# Patient Record
Sex: MALE | Race: WHITE | NOT HISPANIC OR LATINO | Employment: UNEMPLOYED | URBAN - METROPOLITAN AREA
[De-identification: names, ages, dates, MRNs, and addresses within clinical notes are randomized per-mention and may not be internally consistent; named-entity substitution may affect disease eponyms.]

---
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
## 2021-05-26 VITALS
RESPIRATION RATE: 16 BRPM | TEMPERATURE: 98.1 F | OXYGEN SATURATION: 96 % | HEART RATE: 84 BPM | SYSTOLIC BLOOD PRESSURE: 90 MMHG | DIASTOLIC BLOOD PRESSURE: 60 MMHG

## 2021-05-30 ENCOUNTER — RECORDS - HEALTHEAST (OUTPATIENT)
Dept: ADMINISTRATIVE | Facility: CLINIC | Age: 21
End: 2021-05-30

## 2021-05-31 ENCOUNTER — RECORDS - HEALTHEAST (OUTPATIENT)
Dept: ADMINISTRATIVE | Facility: CLINIC | Age: 21
End: 2021-05-31

## 2021-05-31 VITALS — HEIGHT: 60 IN | WEIGHT: 92 LBS | BODY MASS INDEX: 18.06 KG/M2

## 2021-05-31 VITALS — HEIGHT: 60 IN | WEIGHT: 91.3 LBS | BODY MASS INDEX: 17.92 KG/M2

## 2021-05-31 VITALS — WEIGHT: 90.6 LBS | HEIGHT: 60 IN | BODY MASS INDEX: 17.79 KG/M2

## 2021-06-01 VITALS — WEIGHT: 98.5 LBS | HEIGHT: 61 IN | BODY MASS INDEX: 18.6 KG/M2

## 2021-06-01 VITALS — WEIGHT: 94.9 LBS | BODY MASS INDEX: 18.38 KG/M2

## 2021-06-02 ENCOUNTER — RECORDS - HEALTHEAST (OUTPATIENT)
Dept: ADMINISTRATIVE | Facility: CLINIC | Age: 21
End: 2021-06-02

## 2021-06-03 NOTE — PATIENT INSTRUCTIONS - HE
This is likely a resolving cold sore. Recommend keeping the area moist with Vaseline. Try not to lick or pick at the area. Things to watch for and return to clinic are :yellow crusting on the area of opening, redness, pain, and worsening swelling.

## 2021-06-03 NOTE — PROGRESS NOTES
Chief Complaint   Patient presents with     lower lip swollen and splitting x 2-3 days     worsening yesterday and today         HPI:  Sudeep Aguayo is a 19 y.o. male who presents today complaining of lower lip lesion x 2 days. Per mother, patient developed a blister like sore on his lower lip that he has picking at and licking his lips. Mother is concerns that his lower lip is more swollen and that it might be infected. Patient have had a history of cold sores in the past. Mother and patient denies any fever, chills, nausea, vomiting, diarrhea, rashes or shortness of breath. Mother also denies trying anything OTC.      History obtained from mother and the patient.    Problem List:  2017-06: Subclinical hypothyroidism  2017-06: Fluency disorder associated with underlying disease  2016-01: Vitamin D deficiency  2014-11: Anxiety state, unspecified  Poor Coordination  Patent Foramen [place]  Delayed Developmental Milestones  Attention deficit hyperactivity disorder (ADHD)  Warts  Coffin-Raiford Syndrome  Short stature disorder      Past Medical History:   Diagnosis Date     Choanal atresia      Chronic maxillary sinusitis      Chronic pulmonary aspiration      Chronic serous otitis media of both ears      Closed fracture of right distal fibula 11/2004    due to bone cyst     Cryptorchidism      DU (perforated duodenal ulcer) (H) 12/2002     Echocardiogram abnormal 12/2003    small PFO     Esophageal reflux      FTT (failure to thrive) in child      Hypotonia      Lacrimal duct stenosis, bilateral      Positional plagiocephaly     had helmet from 5-8 months of age     S/P percutaneous endoscopic gastrostomy (PEG) tube placement (H)      Speech delay      Torticollis      Trachea displaced     right arytenoid     Varicella 09/15/2006     Warts        Social History     Tobacco Use     Smoking status: Never Smoker     Smokeless tobacco: Never Used   Substance Use Topics     Alcohol use: Not on file       Review of Systems    Constitutional: Negative for activity change, chills and fever.   HENT: Positive for mouth sores. Negative for congestion, drooling, sore throat, trouble swallowing and voice change.    Eyes: Negative for photophobia, pain, discharge and itching.   Respiratory: Negative for cough, choking, chest tightness, shortness of breath and wheezing.    Cardiovascular: Negative for chest pain.   Gastrointestinal: Negative.    Genitourinary: Negative.    Musculoskeletal: Negative.    Skin: Negative for color change and rash.   Neurological: Negative.    Psychiatric/Behavioral: Negative.        Vitals:    11/25/19 1345   BP: (!) 90/60   Patient Site: Right Arm   Patient Position: Sitting   Cuff Size: Adult Small   Pulse: 84   Resp: 16   Temp: 98.1  F (36.7  C)   TempSrc: Oral   SpO2: 96%       Physical Exam  HENT:      Head: Normocephalic and atraumatic.      Nose: Nose normal.      Mouth/Throat:      Mouth: Mucous membranes are moist.      Comments: A .5x.5cm sore noted on the bottom lip that has scabbed over.  Cardiovascular:      Rate and Rhythm: Normal rate.   Pulmonary:      Effort: Pulmonary effort is normal.      Breath sounds: Normal breath sounds.   Musculoskeletal: Normal range of motion.         General: No swelling or tenderness.   Skin:     General: Skin is warm and dry.   Neurological:      Mental Status: He is alert. Mental status is at baseline.         Clinical Decision Making:  Given the clinical presentation and time frame this is likely a resolving cold sore. Other differential to consider are angioedema, dry lip, trauma, impetigo or allergies. It was likely that it started out with a cold sore, but today there is scabbing noted over the site of the sore. Therefore, antiviral is unlikely to be helpful at this time. Recommend to keep lips clean and moist, refrain from licking or picking at the scab, and return to clinic if the there are redness, yellow fluid or crusting, increase pain, or swelling.     At  the end of the encounter, I discussed results, diagnosis, medications. Discussed red flags for immediate return to clinic/ER, as well as indications for follow up if no improvement. Patient understood and agreed to plan. Patient was stable for discharge.    1. Lip lesion           Patient Instructions   This is likely a resolving cold sore. Recommend keeping the area moist with Vaseline. Try not to lick or pick at the area. Things to watch for and return to clinic are :yellow crusting on the area of opening, redness, pain, and worsening swelling.

## 2021-06-04 VITALS
BODY MASS INDEX: 19.53 KG/M2 | HEIGHT: 63 IN | WEIGHT: 110.2 LBS | SYSTOLIC BLOOD PRESSURE: 88 MMHG | HEART RATE: 80 BPM | OXYGEN SATURATION: 97 % | DIASTOLIC BLOOD PRESSURE: 62 MMHG

## 2021-06-04 VITALS
WEIGHT: 105.4 LBS | TEMPERATURE: 98.5 F | HEART RATE: 110 BPM | DIASTOLIC BLOOD PRESSURE: 56 MMHG | OXYGEN SATURATION: 99 % | SYSTOLIC BLOOD PRESSURE: 106 MMHG | RESPIRATION RATE: 16 BRPM | BODY MASS INDEX: 19.92 KG/M2

## 2021-06-05 VITALS
SYSTOLIC BLOOD PRESSURE: 115 MMHG | TEMPERATURE: 99 F | OXYGEN SATURATION: 96 % | WEIGHT: 108 LBS | RESPIRATION RATE: 16 BRPM | BODY MASS INDEX: 18.98 KG/M2 | HEART RATE: 94 BPM | DIASTOLIC BLOOD PRESSURE: 75 MMHG

## 2021-06-05 NOTE — PATIENT INSTRUCTIONS - HE
Keep dressing on the finger for 24-48 hours then may remove and reapply as needed  Wash gently with mild soap and water twice a day and pat dry  Apply vaseline or bacitracin type antibiotic ointment before applying dressing to avoid sticking  The nail will likely come off in time.   Recheck if increasing pain, swelling, redness, pus drainage.   It may ooze a little bit for the first couple days  Tylenol and ibuprofen will help with the pain  Apply ice every couple hours for the first few days then as needed  Tetanus (Tdap) booster given today.

## 2021-06-05 NOTE — PROGRESS NOTES
Assessment/Plan:   Injury of finger of right hand, initial encounter  Contusion and crush injury to the right index finger tip - closed in car door this morning. There is intact function of the DIP joint, no fracture or dislocation on xray. There is a superficial skin tear lateral side of the nailbed which is clean and dry with adherent skin, does not extend into the dermis. The cuticle is cracked and disrupted near that skin tear as well and there is swelling and bruising along the nail bed. Nail is currently intact. There is some blood under the nail, only extending 1-2mm at this time. No suture required. Wound was cleaned as described below and a dressing applied. Continue elevation and ice, wound care. Recheck as needed.   I discussed red flag symptoms, return precautions to clinic/ER and follow up care with patient/parent.  Expected clinical course, symptomatic cares advised. Questions answered. Patient/parent amenable with plan.  - XR Finger Right 2 or More VWS  - Tdap vaccine,  8yo or older,  IM    Keep dressing on the finger for 24-48 hours then may remove and reapply as needed  Wash gently with mild soap and water twice a day and pat dry  Apply vaseline or bacitracin type antibiotic ointment before applying dressing to avoid sticking  The nail will likely come off in time.   Recheck if increasing pain, swelling, redness, pus drainage.   It may ooze a little bit for the first couple days  Tylenol and ibuprofen will help with the pain  Apply ice every couple hours for the first few days then as needed  Tetanus (Tdap) booster given today.     Subjective:      Sudeep Aguayo is a 19 y.o. male who presents with his dad for evaluation of an injury to his right index finger which got caught in the truck door this morning. The door closed all the way and the dad had to open it to release the finger. He was not wearing gloves. There has developed some swelling and a little bleeding at the base of the nail. The  school nurse suggested he have it evaluated further and have his tetanus shot updated. He can bend the finger but it is getting stiff due to swelling. He has used an ice pack from school for the last hour or so. There is a small amount of ongoing bleeding.   Last Dtap was 2012.   He feels well otherwise, no recent fever, URI, cough, shortness of breath. No rashes. No N/V/D.   Non-smoker.     No Known Allergies    Current Outpatient Medications on File Prior to Visit   Medication Sig Dispense Refill     guanFACINE (TENEX) 1 MG tablet Take 1 mg by mouth at bedtime.       methylphenidate HCl (METADATE CD) 30 MG CR capsule TK 1 C PO QAM  0     FLUoxetine (PROZAC) 10 MG tablet TK 1 T PO  QAM  3     guanFACINE 1 mg Tb24 TK 1 T PO QAM       levothyroxine (SYNTHROID, LEVOTHROID) 25 MCG tablet Take 25 mcg by mouth.        methylphenidate HCl (METADATE CD) 30 MG CR capsule   0     No current facility-administered medications on file prior to visit.      Patient Active Problem List   Diagnosis     Poor Coordination     Delayed Developmental Milestones     Attention deficit hyperactivity disorder (ADHD)     Coffin-Gino Syndrome     Short stature disorder     Anxiety state, unspecified     Vitamin D deficiency     Fluency disorder associated with underlying disease     Subclinical hypothyroidism       Objective:     /56 (Patient Site: Right Arm, Patient Position: Sitting, Cuff Size: Adult Small)   Pulse (!) 110   Temp 98.5  F (36.9  C) (Oral)   Resp 16   Wt 105 lb 6.4 oz (47.8 kg)   SpO2 99%     Physical  General Appearance: Alert, cooperative, no distress, AVSS  Head: Normocephalic, without obvious abnormality, atraumatic  Eyes: Conjunctivae are normal.   Nose: No significant congestion.  Extremities/neuro/circulation: the right second finger is swollen at the distal phalanx, there is contusion and skin disruption at the base of the nail and side nearest the 3rd finger. There is swelling, redness and bruising just  proximal to and along the base of the nail but the nails itself is intact. There is some blood under the nail, just 1-2mm extended from the base at this time. There is a crack in the cuticle on one side but the nail does not appear to be loose at the base. There is a superficial semicircular skin tear on the side of the nail near the 3rd finger - it is oozing slightly but not actively bleeding, it does not extend into the dermis and the epidermis is quite adherent.   The cap refill of the tip is normal, the finger is pink and warm, normal wrist pulses.   He has active flexion and extension at DIP, PIP and MCP though the distal tip is getting stiffer due to swelling.   Sensation intact.   After obtaining xray, the finger was soaked in soapy water and the wound cleansed. Bacitracin was applied and vaseline guaze, then kerlix and coban wrap - an aluminum finger splint was used to protect the tip from bumping into things.   Skin:  As above, no other rashes or lesions  Psychiatric: Patient has a normal mood and affect.      Xray of the right index finger obtained and viewed by me showed no fracture or dislocation.     Xr Finger Right 2 Or More Vws    Result Date: 1/23/2020  EXAM: XR FINGER RIGHT 2 OR MORE VWS LOCATION: [facility] DATE/TIME: 1/23/2020 9:31 AM INDICATION: slammed finger in car door COMPARISON: None.     Normal joint spaces and alignment. No fracture.

## 2021-06-11 NOTE — PROGRESS NOTES
"[facility] ADHD Medication Check:    SUBJECTIVE:  Sudeep Aguayo is a 17 y.o. male with ADHD, developmental delay, short stature, and Coffin-Homeland syndrome, who comes to clinic with his mother for an ADHD medication check. He is currently on Metadate CD 30 mg-this was changed from Concerta due to insurance. Mom reports that the dose is working well during the school day. He recently completed 10th grade and has an IEP.     However, mom is concerned about \"episodes of being obsessed\", particularly after school. This has happened several times. Mom gives me the example of when they were at a store and he wanted to buy something for a friend and wouldn't take his mom's suggestions. He made \"a fuss\" per mom and the police were called. Mom states that this has happened several times-he becomes angry and makes a \"scene\", and she's not sure what to do. Today he is very upset that he's having to miss summer [place] due to appointments for he and mother. Mom states that once he gets an idea of something he wants to do, he perseverates on it and becomes angry when he's told he can't do it. He does not attend therapy. He does become physical with these outbursts.    Mom also has a question about his endocrine management. He is being followed by Dr. Borrero at [facility] and has been on Omnitrope for short stature and levothyroxine for subclinical hypothyroidism. Mom stopped his Omnitrope about a month ago because she states she was happy with his growth. She is wondering how much longer he will need to take the levothyroxine. There are upcoming concerns with a fall out between [facility] and [facility], which will not allow Sudeep to continue care, so mom has questions about what to do next. He does have a refill on his levothyroxine and his labs were last checked in March of 2017 and were normal.    Pt is currently on [place]adale 30 mg daily  He was last seen on 11/1/16    Possible Side " Effects: none  Use on weekends and holidays: yes  When takes medicine: mornings  Time that is wears off: after school, around 3-4 pm    Current school and grade level: Going into 11th grade  Special classes if any: has an IEP  CSA on File: no, will complete today    Past Medical History:  Past Medical History:   Diagnosis Date     Choanal atresia      Chronic maxillary sinusitis      Chronic pulmonary aspiration      Chronic serous otitis media of both ears      Closed fracture of right distal fibula 11/2004    due to bone cyst     Cryptorchidism      DU (perforated duodenal ulcer) 12/2002     Echocardiogram abnormal 12/2003    small PFO     Esophageal reflux      FTT (failure to thrive) in child      Hypotonia      Lacrimal duct stenosis, bilateral      Positional plagiocephaly     had helmet from 5-8 months of age     S/P percutaneous endoscopic gastrostomy (PEG) tube placement      Speech delay      Torticollis      Trachea displaced     right arytenoid     Warts      Past Surgical History:   Procedure Laterality Date     BRONCHOSCOPY  03/2003     Duodenal Ulcer Perforation Repair  12/2002     ORCHIOPEXY Bilateral 11/01/2002     PEG TUBE PLACEMENT  12/2002     REPAIR CHOANAL ATRESIA Bilateral 2001     SKIN BIOPSY       TONSILECTOMY, ADENOIDECTOMY, BILATERAL MYRINGOTOMY AND TUBES Bilateral 2002    at [place]       Current Meds:   Current Outpatient Prescriptions on File Prior to Visit   Medication Sig Dispense Refill     levothyroxine (SYNTHROID, LEVOTHROID) 25 MCG tablet Take 25 mcg by mouth.        [DISCONTINUED] somatropin (OMNITROPE) 10 mg/1.5 mL (6.7 mg/mL) Crtg Inject 1.6 mL under the skin daily.  1.5 mL 0     No current facility-administered medications on file prior to visit.      Allergies: No Known Allergies    ROS:  General: Health is good  Endocrine: Growing in height and weight as expected for syndrome  Cardiac: No chest pain, palpitations, or syncope, No chest pain with exercise   GI: Good  appetite, no stomachaches, no nausea, no diarrhea, no emesis, no constipation  : no enuresis  Neuro: No headaches, sleep disturbance, tics, changes in mood, no changes in activity level.     Exam:  Vitals:    06/15/17 0810   BP: 120/54   Weight: (!) 91 lb 4.8 oz (41.4 kg)   Height: 5' (1.524 m)       MENTAL STATUS:  Gen: Alert, oriented. Syndromic facies  Speech: Speech dysarthric  Mood: euthymic.    Thought content: No abnormal thought content.  Judgment: intact  Fund of knowledge: appropriate for age and stage of development  Neck: thyroid non enlarged  Cardiovascular Exam: RRR without murmurs, clicks or gallops.  Lung Exam: Clear and equal breath sounds.  Musculoskeletal Exam: Gross survey unremarkable. Gait uncoordinated. Short stature    ASSESSMENT/PLAN:    ICD-10-CM    1. Attention deficit hyperactivity disorder (ADHD) F90.9 methylphenidate (METADATE CD) 30 MG CR capsule     methylphenidate (METADATE CD) 30 MG CR capsule     methylphenidate (METADATE CD) 30 MG CR capsule   2. Subclinical hypothyroidism E03.9      Three sequential prescriptions for Metadate CD 30 mg daily sent to pharmacy. Mom advised to contact clinic towards the end of his third month of prescriptions to obtain an additional three sequential months  Completed Controlled Substance Agreement today  Advised getting him established with a therapist, to be seen on a regular basis for coping and anger management strategies. List of local services provided to mom and mom advised to contact and establish care. Mom acknowledged understanding and agrees with plan.  Recommended that mom get him seen for a 16 month WCC and update in vaccinations within the next month. Mom acknowledged understanding and agrees with plan.    Called and discussed Endocrinology plan with Dr. Borrero given upcoming [facility] concerns [facility]. Patient may not be able to be seen due to insurance issues, so plan will be as follows:   -okay  to stop Omnitrope-mom okay with his height   -continue levothyroxine 25 mcg tablet daily   -will need recheck of TSH, free T4 in September 2017. If normal, could consider discontinuing if mom wants to have him off it (has likely achieved maximum height and his symptoms are subclinical)   -if insurance issues resolve, would plan to have him re-establish care with [facility]   -this plan was communicated to mom via voicemail and letter will also be sent    A total of 40 minutes was spent on encounter, greater than 50% of which was on counseling and coordination of care     Kelsey Drake ....................  6/20/2017   8:16 AM

## 2021-06-12 NOTE — TELEPHONE ENCOUNTER
Per Sheets Sr. calls that Sudeep got off from work today and developed a scratchy throat. Also has nasal drainage for weeks. Concerned about COVID.    No fever, no chest pain, NO shortness of breath.    Per FNA chart review, patient does have developmental delays and ADHD. He works every weekend.    FNA advised COVID testing, and isolate until results return negative or further instructions from care team. Per verbalized understanding. Agreed with virtual urgent care visit.    COVID 19 Nurse Triage Plan/Patient Instructions    Please be aware that novel coronavirus (COVID-19) may be circulating in the community. If you develop symptoms such as fever, cough, or SOB or if you have concerns about the presence of another infection including coronavirus (COVID-19), please contact your health care provider or visit [email or web address].     Disposition/Instructions    Virtual Visit with provider recommended. Reference Visit Selection Guide. (urgent care with [place] within the day) Per verbalized agreement.    Thank you for taking steps to prevent the spread of this virus.  o Limit your contact with others.  o Wear a simple mask to cover your cough.  o Wash your hands well and often.    Resources    [facility] [place]: About COVID-19: [email or web address]    CDC: What to Do If You're Sick: www.cdc.gov/coronavirus/2019-ncov/about/steps-when-sick.html    CDC: Ending Home Isolation: www.cdc.gov/coronavirus/2019-ncov/hcp/disposition-in-home-patients.html     CDC: Caring for Someone: www.cdc.gov/coronavirus/2019-ncov/if-you-are-sick/care-for-someone.html     [facility]: Interim Guidance for Hospital Discharge to Home: www.health.state.[place]/diseases/coronavirus/hcp/hospdischarge.pdf    [facility] clinical trials (COVID-19 research studies): [email or web address]     Below are the COVID-19 hotlines at the [place] Department of Health ([facility]). Interpreters are available.   o For health questions:  Call [phone] or 1-818.715.2251 (7 a.m. to 7 p.m.)  o For questions about schools and childcare: Call [phone] or 1-610.907.7347 (7 a.m. to 7 p.m.)     Pat Samuel RN/[place] Nurse Advisor      Reason for Disposition    [1] COVID-19 infection suspected by caller or triager AND [2] mild symptoms (cough, fever, or others) AND [3] no complications or SOB    Additional Information    Negative: SEVERE difficulty breathing (e.g., struggling for each breath, speaks in single words)    Negative: Difficult to awaken or acting confused (e.g., disoriented, slurred speech)    Negative: Bluish (or gray) lips or face now    Negative: Shock suspected (e.g., cold/pale/clammy skin, too weak to stand, low BP, rapid pulse)    Negative: Sounds like a life-threatening emergency to the triager    Negative: SEVERE or constant chest pain or pressure (Exception: mild central chest pain, present only when coughing)    Negative: MODERATE difficulty breathing (e.g., speaks in phrases, SOB even at rest, pulse 100-120)    Negative: Patient sounds very sick or weak to the triager    Negative: MILD difficulty breathing (e.g., minimal/no SOB at rest, SOB with walking, pulse <100)    Negative: Chest pain or pressure    Negative: Fever > 103 F (39.4 C)    Negative: [1] Fever > 101 F (38.3 C) AND [2] age > 60    Negative: [1] Fever > 100.0 F (37.8 C) AND [2] bedridden (e.g., nursing home patient, CVA, chronic illness, recovering from surgery)    Negative: HIGH RISK patient (e.g., age > 64 years, diabetes, heart or lung disease, weak immune system) (Exception: Has already been evaluated by healthcare provider and has no new or worsening symptoms)    Negative: Fever present > 3 days (72 hours)    Negative: [1] Fever returns after gone for over 24 hours AND [2] symptoms worse or not improved    Negative: [1] Continuous (nonstop) coughing interferes with work or school AND [2] no improvement using cough treatment per protocol    Protocols  used: CORONAVIRUS (COVID-19) DIAGNOSED OR [id number] 8.4.20

## 2021-06-12 NOTE — TELEPHONE ENCOUNTER
Question following Office Visit  When did you see your provider: 11/3  What is your question: Caller stated she was informed by Dr Nesbitt that was going to have someone contact patient to get tested for strep and they have no heard anything. Caller stated that she is very concerned as the patient's throat is very painful and he can hardly eat. Caller would like a call back to schedule for strep test as soon as possible.  Okay to leave a detailed message: Yes  [phone]

## 2021-06-12 NOTE — PROGRESS NOTES
Assessment:      Satisfactory school sports physical exam.  (adaptive sports)    2. Poor coordination  3.  Short stature  4.  Coffin-McDonald syndrome   Plan:      Permission granted to participate in athletics without restrictions. Form signed and returned to patient.  Anticipatory guidance: Specific topics reviewed: bicycle helmets, drugs, ETOH, and tobacco, importance of regular dental care, importance of regular exercise, importance of varied diet, minimize junk food, puberty, seat belts and testicular self-exam.     Subjective:       Sudeep Aguayo is a 17 y.o. male who presents for a school sports physical exam. Patient/parent deny any current health related concerns.  He plans to participate in tennis, other adaptive sports. He cannot participate in collision contact sports.     Immunization History   Administered Date(s) Administered     DTaP, historic 2001, 2001, 01/07/2002, 02/10/2003, 06/27/2006     Hep B, historic 2001, 2001, 2001, 01/07/2002     HiB, historic 2001, 2001, 01/07/2002, 02/10/2003     History of Varicella/chicken Pox 09/15/2006     IPV 2001, 2001, 2001, 01/07/2002, 06/27/2006     Influenza, inj, historic 02/10/2003, 12/10/2003, 10/19/2004, 11/15/2005, 09/21/2010     MMR 06/05/2002, 06/27/2006     Pneumo Conj 7-V(before 2010) 06/25/2003     Tdap 08/16/2012     Varicella 02/10/2003       The following portions of the patient's history were reviewed and updated as appropriate: allergies, current medications, past family history, past medical history, past social history, past surgical history and problem list.    Review of Systems  Pertinent items are noted in HPI      Objective:      BP 98/60  Pulse 74  Resp 20  Ht 5' (1.524 m)  Wt (!) 92 lb (41.7 kg)  BMI 17.97 kg/m2    General Appearance:  Alert, cooperative, no distress, appropriate for age                             Head:  Normocephalic, no obvious abnormality                               Eyes:  PERRL, EOM's intact, conjunctiva and corneas clear, fundi benign, both eyes                              Nose:  Nares symmetrical, septum midline, mucosa pink, clear watery discharge; no sinus tenderness                           Throat:  Lips, tongue, and mucosa are moist, pink, and intact; teeth intact                              Neck:  Supple, symmetrical, trachea midline, no adenopathy; thyroid: no enlargement, symmetric,no tenderness/mass/nodules; no carotid bruit, no JVD                              Back:  Symmetrical, no curvature, ROM normal, no CVA tenderness                Chest/Breast:  No mass or tenderness                            Lungs:  Clear to auscultation bilaterally, respirations unlabored                              Heart:  Normal PMI, regular rate & rhythm, S1 and S2 normal, no murmurs, rubs, or gallops                      Abdomen:  Soft, non-tender, bowel sounds active all four quadrants, no mass, or organomegaly               Genitourinary:  deferred          Musculoskeletal:  Tone and strength strong and symmetrical, all extremities                     Lymphatic:  No adenopathy             Skin/Hair/Nails:  Skin warm, dry, and intact, no rashes or abnormal dyspigmentation                   Neurologic:  Alert and oriented x3, no cranial nerve deficits, normal strength and tone, gait steady

## 2021-06-12 NOTE — PROGRESS NOTES
"Sudeep Aguayo is a 20 y.o. male who is being evaluated via a billable telephone visit.      The patient has been notified of following:     \"This telephone visit will be conducted via a call between you and your physician/provider. We have found that certain health care needs can be provided without the need for a physical exam.  This service lets us provide the care you need with a short phone conversation.  If a prescription is necessary we can send it directly to your pharmacy.  If lab work is needed we can place an order for that and you can then stop by our lab to have the test done at a later time.    Telephone visits are billed at different rates depending on your insurance coverage. During this emergency period, for some insurers they may be billed the same as an in-person visit.  Please reach out to your insurance provider with any questions.    If during the course of the call the physician/provider feels a telephone visit is not appropriate, you will not be charged for this service.\"    Patient has given verbal consent to a Telephone visit? Yes    What phone number would you like to be contacted at? [phone]    Patient would like to receive their AVS by AVS Preference: Charlie.    Additional provider notes:  Assessment/Plan:    1. Sore throat  2. Rhinorrhea  Somewhat difficult situation at this time.  Symptoms suspicious for COVID-19 versus strep throat. Pt has COVID19 test pending (outside of system), result will likely return within 24 hrs. I do not feel comfortable having him come to clinic for strep testing given COVID19 test is pending; I also would recommend not starting empiric antibiotics when COVID19 test should be back within 24 hours. Would recommend having pt be seen at [facility] for strep testing vs waiting until COVID19 test comes back and if negative then could consider empiric strep throat treatment. Clinic staff/manager discussed with parents and they will bring pt to [facility] today to get swab " "done, no charge for our telephone visit.      Phone call duration:  12 minutes    Follow up: as needed    So Nesbitt MD  [facility]    Subjective:    Patient ID: Sudeep Aguayo is a 20 y.o. male had telephone visit today for sore throat    Sore throat  -spoke primarily with mom but pt present as well  -mom states over the past few years pt will get a mild sore throat and have \"gunk in throat\" and have to clear throat - they have attributed this to allergies  -mom states this started a few weeks ago but now sore throat has been worsening - she states it is hard for him to swallow and his voice is hoarse  -mom states pt has been drinking but not eating as much d/t pain  -no fever  -no cough, shortness of breath, ear sx, nausea/vomiting, diarrhea, abd pain  -pt also has rhinorrhea/congestion but mom thinks this could be allergy related  -per chart review: dad called on 11/1 at 4:37pm and spoke with nurse triage regarding symptoms, they were referred to [facility] for COVID19 testing (scheduled tomorrow afternoon)  -mother very frustrated by inability to get testing done quickly      Exam:  General: Answering questions appropriately, linear thought process, does not sound short of breath, no cough heard, voice somewhat hoarse    Patient Active Problem List   Diagnosis     Poor Coordination     Delayed Developmental Milestones     Attention deficit hyperactivity disorder (ADHD)     Coffin-Gino syndrome     Short stature disorder     Anxiety state, unspecified     Vitamin D deficiency     Fluency disorder associated with underlying disease     Subclinical hypothyroidism     Past Medical History:   Diagnosis Date     Choanal atresia      Chronic maxillary sinusitis      Chronic pulmonary aspiration      Chronic serous otitis media of both ears      Closed fracture of right distal fibula 11/2004    due to bone cyst     Cryptorchidism      DU (perforated duodenal ulcer) (H) 12/2002     Echocardiogram " abnormal 12/2003    small PFO     Esophageal reflux      FTT (failure to thrive) in child      Hypotonia      Lacrimal duct stenosis, bilateral      Positional plagiocephaly     had helmet from 5-8 months of age     S/P percutaneous endoscopic gastrostomy (PEG) tube placement (H)      Speech delay      Torticollis      Trachea displaced     right arytenoid     Varicella 09/15/2006     Warts      Past Surgical History:   Procedure Laterality Date     BRONCHOSCOPY  03/2003     Duodenal Ulcer Perforation Repair  12/2002     ORCHIOPEXY Bilateral 11/01/2002     PEG TUBE PLACEMENT  12/2002     REPAIR CHOANAL ATRESIA Bilateral 2001     SKIN BIOPSY       TONSILECTOMY, ADENOIDECTOMY, BILATERAL MYRINGOTOMY AND TUBES Bilateral 2002    at [place]     Current Outpatient Medications on File Prior to Visit   Medication Sig Dispense Refill     guanFACINE 1 mg Tb24 TK 1 T PO QAM 90 tablet 3     methylphenidate HCl (METADATE CD) 30 MG CR capsule   0     No current facility-administered medications on file prior to visit.      No Known Allergies  Social History     Socioeconomic History     Marital status: Single     Spouse name: Not on file     Number of children: Not on file     Years of education: Not on file     Highest education level: Not on file   Occupational History     Not on file   Social Needs     Financial resource strain: Not on file     Food insecurity     Worry: Not on file     Inability: Not on file     Transportation needs     Medical: Not on file     Non-medical: Not on file   Tobacco Use     Smoking status: Never Smoker     Smokeless tobacco: Never Used     Tobacco comment: no vaping   Substance and Sexual Activity     Alcohol use: Never     Frequency: Never     Drug use: Never     Sexual activity: Not on file   Lifestyle     Physical activity     Days per week: Not on file     Minutes per session: Not on file     Stress: Not on file   Relationships     Social connections     Talks on phone: Not on file     Gets  together: Not on file     Attends Muslim service: Not on file     Active member of club or organization: Not on file     Attends meetings of clubs or organizations: Not on file     Relationship status: Not on file     Intimate partner violence     Fear of current or ex partner: Not on file     Emotionally abused: Not on file     Physically abused: Not on file     Forced sexual activity: Not on file   Other Topics Concern     Not on file   Social History Narrative    Lives with mom, dad, older half sister, and older sister.     Family History   Problem Relation Age of Onset     ADD / ADHD Cousin         paternal cousin     Diabetes type II Maternal Grandmother      Cancer Paternal Grandfather      Review of systems is as stated in HPI, and the remainder of system review is otherwise negative.

## 2021-06-12 NOTE — TELEPHONE ENCOUNTER
Father states he never rec'd a call he expected @ 8pm for virtual UC visit for his son.   Per [facility] , note found in chart: attempted x4, no answer, to [phone]--which is the correct phone number.    Virtual visits are closed for this evening. Suggested he try [email or web address], which he intends to do now.     Geena Redmond RN Triage Nurse Advisor

## 2021-06-13 NOTE — PROGRESS NOTES
[facility] ADHD Medication Check:    SUBJECTIVE:  Sudeep Aguayo is a 17 y.o. male here with father to follow up on ADHD.     Behavior: His father feels that things are going alright, while he thinks they are going well. He recently got the black tip belt in [facility], unfortunately he had it take it away due to being aggressive after the session. He does still struggle with some aggressive behaviors whenever he is upset. Father states that his aggressive behaviors never occur when he is present at home, that it has a tendency to occur whenever he is in the care of his mother.     At school everything was going well except for last Monday because he missed the bus on purpose and ended up at [facility] and not at home. A para found him at [facility] and reported him to his parents. As a consequence he is no longer allowed to participate in after school activities. His father is trying to help him understand that if and when he is allowed to participate in the [employer] group, that he is unable to attend all of the events. The medications have helped him in the classroom specifically with fidgeting. However, his father is wondering whether he needs to continue on the same dose. He is generally not aggressive at school. He tends to have more behavior problems and aggression at the end of the school day when the medication is starting to wear off.     In his previous visit it was discussed that he should consider a therapist, but his father admits that it was not done or followed up on.      Pt is currently on Metadate CD 30 mg   He was last seen on 6/15/17    Possible Side Effects: none. Possibly preventing weight gain.   Use on weekends and holidays: Not given on the weekends because it is a stimulant and an appetite suppressant.   When takes medicine: Taken in the mornings.   Time that is wears off: Tends to wear off after school around 3-4pm.     Current school and grade level: 11th grade   Special classes  if any: Has an IEP. In the future he will be participating in a [employer] program that will teach him to be more independent.   Peer interactions: He claims they are going well.   Mood: Has had three aggressive episodes when in the care of his mother.   Sleep: Fine.   Swallow pills: yes  Drug use: none  Other concerns or comments: All mentioned in HPI.   CSA on File: Yes, 6/15/17.     Social history:   Lives at home with Mother, Father, and sister (?)  Family stressors: Relationship with mother.     Family history:   ADHD: Yes, Cousin with ADD/ADHD.   Learning problems: n/a  Anxiety, Bipolar, depression: n/a  Tic's or tourette's: n/a  Hypertrophic cardiomyopathy, long Qtc and sudden death: n/a    Past Medical History:  Past Medical History:   Diagnosis Date     Choanal atresia      Chronic maxillary sinusitis      Chronic pulmonary aspiration      Chronic serous otitis media of both ears      Closed fracture of right distal fibula 11/2004    due to bone cyst     Cryptorchidism      DU (perforated duodenal ulcer) 12/2002     Echocardiogram abnormal 12/2003    small PFO     Esophageal reflux      FTT (failure to thrive) in child      Hypotonia      Lacrimal duct stenosis, bilateral      Positional plagiocephaly     had helmet from 5-8 months of age     S/P percutaneous endoscopic gastrostomy (PEG) tube placement      Speech delay      Torticollis      Trachea displaced     right arytenoid     Varicella 09/15/2006     Warts      Past Surgical History:   Procedure Laterality Date     BRONCHOSCOPY  03/2003     Duodenal Ulcer Perforation Repair  12/2002     ORCHIOPEXY Bilateral 11/01/2002     PEG TUBE PLACEMENT  12/2002     REPAIR CHOANAL ATRESIA Bilateral 2001     SKIN BIOPSY       TONSILECTOMY, ADENOIDECTOMY, BILATERAL MYRINGOTOMY AND TUBES Bilateral 2002    at [place]       Current Meds:   Current Outpatient Prescriptions on File Prior to Visit   Medication Sig Dispense Refill     levothyroxine (SYNTHROID,  "LEVOTHROID) 25 MCG tablet Take 25 mcg by mouth.        No current facility-administered medications on file prior to visit.      Allergies: No Known Allergies    ROS:  General: Health is good  Endocrine: Growing in height and weight well.  Cardiac: No chest pain, palpitations, or syncope, No chest pain with exercise   GI: Good appetite, no stomachaches, no nausea, no diarrhea, no emesis, no constipation  : no enuresis  Neuro: No headaches, sleep disturbance, tics, changes in mood, no changes in activity level.     Exam:  Vitals:    09/27/17 1609   BP: 108/52   Weight: (!) 90 lb 9.6 oz (41.1 kg)   Height: 5' 0.25\" (1.53 m)       MENTAL STATUS:  Gen: Alert, oriented. Syndromic facies.   Speech:No abnormal speech or flight of ideas. Speech dysarthric  Mood: euthymic.    Thought content: No abnormal thought content.  Judgment: intact  Fund of knowledge: appropriate for age.  Neck: thyroid non enlarged  Cardiovascular Exam: RRR without murmurs, clicks or gallops.  Lung Exam: Clear and equal breath sounds.  Musculoskeletal Exam: Gross survey unremarkable. Gait uncoordinated. Short stature    ASSESSMENT/PLAN:    ICD-10-CM    1. Attention deficit hyperactivity disorder (ADHD) F90.9 methylphenidate HCl (METADATE CD) 30 MG CR capsule     methylphenidate HCl (METADATE CD) 30 MG CR capsule     methylphenidate HCl (METADATE CD) 30 MG CR capsule   2. Need for vaccination Z23 Hepatitis A vaccine pediatric / adolescent 2 dose IM     Meningococcal MCV4P       Prescriptions sent for 3 sequential months. Dad advised to contact clinic towards the end of the third month to obtain an additional 3 months. Next ADHD medication check due in 6 months (March 2018).  Recommended that dad check with [employer] to see about therapy/anger management resources. Told dad I would also [facility] any resources as well. Dad acknowledged understanding and agrees with plan.  [facility] as previously advised.   He was also in need of vaccinations, so first Hepatitis A and 1st Menactra were given at this visit. Declined influenza and HPV vaccines. Dad was informed that he will still need a 2nd Hepatitis A and 2nd Menactra in the future.     Orders Placed This Encounter   Procedures     Hepatitis A vaccine pediatric / adolescent 2 dose IM     Order Specific Question:   Counseling provided to include answering patients questions and/or preemptively discussing the risks and benefits of all components.     Answer:   Yes     Meningococcal MCV4P     Order Specific Question:   Counseling provided to include answering patients questions and/or preemptively discussing the risks and benefits of all components.     Answer:   Yes       Patient Instructions   1. Call [facility] to [facility] an appointment  2. Check with [employer] to see about therapy/anger management assistance  3. I will be in touch after I discuss options for other medications with a specialist       ADDITIONAL HISTORY SUMMARIZED (2): None.  DECISION TO OBTAIN EXTRA INFORMATION (1): None.   RADIOLOGY TESTS (1): None.  LABS (1): None.  MEDICINE TESTS (1): None.  INDEPENDENT REVIEW (2 each): None.     The visit lasted a total of 40 minutes face to face with the patient. Over 50% of the time was spent counseling and educating the patient about ADHD.    I, Erika Colvin, am scribing for and in the presence of, Dr. Drake.    I, Dr. Drake, personally performed the services described in this documentation, as scribed by Erika Colvin in my presence, and it is both accurate and complete.    Total Data: 0       Kelsey Drake ....................  10/1/2017   4:33 PM

## 2021-06-17 NOTE — PROGRESS NOTES
"ASSESSMENT/PLAN:   1. Acute non-recurrent maxillary sinusitis  Rapid Strep A Screen-Throat    Group A Strep, RNA Direct Detection, Throat    amoxicillin-clavulanate (AUGMENTIN) 875-125 mg per tablet     Patient appears well and is tolerating oral intake. No signs of peritonsillar or retropharyngeal abscess on exam. Clear lungs. Strep test negatvie, exam is consistent with a acute right maxillary sinusitis.  Prescription for Augmentin is sent to patient's pharmacy.  Both dad and patient advised on side effects of medication, symptomatic cares.  Will return to clinic with high fever, new or worsening symptoms.  Will follow up with primary care if no improvement after completion of medication.      At the end of the encounter, I discussed results, diagnosis, medications. Discussed red flags for immediate return to clinic/ER, as well as indications for follow up if no improvement. Please view below patient instructions for patient education and return precautions as were discussed during visit.  Patient/parent  understood and agreed to plan. Patient was stable for discharge.      Patient Instructions:  Patient Instructions   I think this is a sinus infection.  I have sent a prescription for Augmentin to the pharmacy.  1. Take Augmentin twice daily with food. Take a probiotic such as Culturelle or Florastor while on the antibiotic or eat a Greek yogurt containing \"live active cultures\" daily.    Symptomatic cares recommended:  -nasal saline rinses/sprays 1-2 times daily. Obtain nasal saline spray (Ayr or Ocean are brand names).  Get into a hot shower, and wait for the sensation of your sinuses \"opening\". Occlude one side of your nose, and use a gentle spray of saline into the opposite side of your nose.  Then blow your nose to try to mobilize the nasal secretions.  -adequate rest  -copious hydration  -ibuprofen or acetaminophen for comfort  -Robitussin or Mucinex as needed for cough, nasal congestion  -throat lozenges as " "needed for sore throat    Follow-up with primary care provider if not improving in 7-10 days, sooner if worsening.     If you develop high fevers that do not go down with ibuprofen/Tylenol, shortness of breath, cough up any blood, chest pain, or any other new, concerning symptoms, please go to the ER for further evaluation.      SUBJECTIVE:   Sudeep Aguayo is a 17 y.o. 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  No measured fever.  Patient denies subjective fever symptoms.  No cough.  No nausea, vomiting, diarrhea, abdominal pain.  No rash.  Taking Mucinex without relief.    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     Short stature disorder     Anxiety state, unspecified     Vitamin D deficiency     Fluency disorder associated with underlying disease     Subclinical hypothyroidism       Surgical History:    Reviewed; Non-contributory    Family History:  Family History   Problem Relation Age of Onset     ADD / ADHD Cousin      paternal cousin     Diabetes type II Maternal Grandmother      Cancer Paternal Grandfather        Reviewed; Non-contributory      Social History:    History   Smoking Status     Never Smoker   Smokeless Tobacco     Never Used         Current Medications:  Current Outpatient Prescriptions on File Prior to Visit   Medication Sig Dispense Refill     levothyroxine (SYNTHROID, LEVOTHROID) 25 MCG tablet Take 25 mcg by mouth.        methylphenidate HCl (METADATE CD) 30 MG CR capsule TK 1 C PO QAM  0     methylphenidate HCl (METADATE CD) 30 MG CR capsule Take 1 capsule (30 mg total) by mouth every morning. Call clinic to obtain additional 3 months refill 30 capsule 0     methylphenidate HCl (METADATE " "CD) 30 MG CR capsule Take 1 capsule (30 mg total) by mouth every morning. 30 capsule 0     methylphenidate HCl (METADATE CD) 30 MG CR capsule Take 1 capsule (30 mg total) by mouth every morning. 30 capsule 0     No current facility-administered medications on file prior to visit.        Allergies:   No Known Allergies    I personally reviewed patient's past medical, surgical, social, family history and allergies.    ROS:  Comprehensive 12 pt ROS completed, positives noted in HPI, otherwise negative.      OBJECTIVE:   BP 99/58 (Patient Site: Right Arm, Patient Position: Sitting, Cuff Size: Adult Small)  Pulse 83  Temp 98.1  F (36.7  C) (Oral)   Resp 16  Ht 5' 1\" (1.549 m)  Wt 98 lb 8 oz (44.7 kg)  SpO2 96%  BMI 18.61 kg/m2      General Appearance:  Alert,  well-appearing male in NAD. Afebrile.    Integument: Warm, dry, no rash  HEENT:  Head: Atraumatic, normocephalic. Face nontraumatic.  Eyes: Conjunctiva clear, Lids normal.  Ears:   TMs pearly, translucent bilaterally. No canal erythema or edema. No mastoid tenderness. No pain with palpation over tragus.  Nose: nares patent. Moderate erythema of nasal mucosa. No rhinorrhea. Mild swelling noted over right maxillary sinus, area is tender to palpation. Remaining sinuses non-tender.  Oropharynx:  No trismus. Moderate posterior pharyngeal erythema. No palatal petechiae. No tonsillar hypertrophy, Minimal exudate. Uvula midline. Moist mucus membranes. Near constant throat clearing   Neck: Supple, anterior cervical lymphadenopathy.  No meningismus.  Respiratory: No distress.  Lungs clear to ausculation bilaterally. No crackles, wheezes, rhonchi or stridor.  Cardiovascular: Regular rate and rhythm, no murmur, rub or gallop. No obvious chest wall deformities. Peripheral pulses 2+ bilaterally. No peripheral edema.  GI: Soft, nontender, normal bowel sounds. No masses, organomegaly, rigidity, or guarding.           Radiology:  I personally ordered and viewed this study. I " agree with below radiology findings.    No results found.      Laboratory Studies:  I personally ordered and interpreted these studies.    Recent Results (from the past 24 hour(s))   Rapid Strep A Screen-Throat   Result Value Ref Range    Rapid Strep A Antigen No Group A Strep detected, presumptive negative No Group A Strep detected, presumptive negative

## 2021-06-18 NOTE — PATIENT INSTRUCTIONS - HE
Patient Instructions by Chinedu Hernandez PA-C at 11/3/2020  3:30 PM     Author: Chinedu Hernandez PA-C Service: -- Author Type: Physician Assistant    Filed: 11/3/2020  4:43 PM Encounter Date: 11/3/2020 Status: Addendum    : Chinedu Hernandez PA-C (Physician Assistant)    Related Notes: Original Note by Chinedu Hernandez PA-C (Physician Assistant) filed at 11/3/2020  4:42 PM       Your rapid strep test was positive today. We will treat with a course of antibiotics. Please complete the full course of antibiotics. You can take your medication with food and with a probiotic such as Culturelle to prevent stomach irritation. You will be contagious for 24 hours following initiation of the medication.    You may use Tylenol or Motrin for pain and fevers.    May drink warm tea, gargle saline solution, or use throat lozenges to sooth throat pain.    Change toothbrush after 48 hours of starting the antibiotic to prevent reinfection.    Watch for resolution of symptoms in the next few days. If you continue to have high fevers, begin to have difficulty swallowing or breathing, notice worsening neck pain, or difficulty moving neck, please return to clinic or present to the emergency room immediately. Otherwise, follow up with your primary care provider as needed.    Patient Education     Peritonsillar Abscess  You (or your child) has an abscess (collection of pus) around the tonsils. This abscess can cause severe sore throat, pain with swallowing, fever, drooling, and trouble opening the mouth.  The abscess is treated with antibiotics. These may be started using an IV (intravenous line), then continued by mouth. In most cases, the abscess will also be drained.  Home care    All of the antibiotics should be taken as prescribed until they are gone. This is true even if symptoms start to get better. This is very important to ensure that the infection goes away. This infection can lead to serious  complications.    Pain medicines should be taken as directed.    To help ease pain, children older than 6 years and adults can gargle with warm salt water four times a day for the first 2 days. Dissolve 1/2 teaspoon of salt in 1 glass of hot water. Gargle with the solution then spit it out. (Ensure that children do not swallow the salt water.)    Cool liquids and soft foods may make eating easier for the first few days.  Follow-up care  Follow up with a healthcare provider or as advised within 1-2 days.   When to seek medical advice  Call the healthcare provider right away if any of the following occur:    Fever of 100.4 F (38 C) or higher after 3 days of treatment    Symptoms that get worse    Symptoms that go away and come back    Trouble swallowing liquids or taking medicine    Trouble breathing    Neck stiffness    Bleeding    Rash    Swelling or bumps in the neck  Date Last Reviewed: 10/1/2017    [id number] [employer]. [address] [place] [address], [place] [address]. All rights reserved. This information is not intended as a substitute for professional medical care. Always follow your healthcare professional's instructions.

## 2021-06-26 NOTE — PROGRESS NOTES
Progress Notes by Chinedu Hernandez PA-C at 2/22/2018  4:50 PM     Author: Chinedu Hernandez PA-C Service: -- Author Type: Physician Assistant    Filed: 2/22/2018  8:16 PM Encounter Date: 2/22/2018 Status: Signed    : Chinedu Hernandez PA-C (Physician Assistant)         Assessment:       Cellulitis of right index finger.      Plan:       Antibiotics given.  Wound edges marked for follow-up.   Recommended topical antibiotics with a bandage to cover the site and prevent picking  Patient will also wear their glove to cover the finger  Discussed signs of worsening infection and when to follow-up with PCP if no symptom improvement.    Patient Instructions     Your child was seen today for an infection of the skin known as Cellulitis. Follow instructions below for care and proper follow-up.    Discharge Instructions for Cellulitis (Child)  Your child was diagnosed with cellulitis. This is an infection that occurs at the deepest layer of the skin. Cellulitis is caused by bacteria. Bacteria can get into the body through broken skin, such as a cut, scratch, sore, animal bite, or through a rash that causes a break in the skin. Your child was treated in the hospital with IV antibiotics. Below are instructions for caring for your child at home.  Home care    Elevate your regla wound if possible. This will help keep the swelling down.    Wash your hands before and after touching any cuts, scratches, or bandages to prevent infections.    Keep the infected area clean.    Apply clean bandages or gauze dressings as directed by your regla healthcare provider.    Be sure your child finishes all the medicine that was prescribed. If your child doesnt finish the medicine, the infection may return. Not finishing the medicine can also make any future infections harder to treat.    May give your child a pain reliever such as Tylenol or Ibuprofen as directed on the product packaging    If your child feels warm or seems  feverish, measure your child's temperature.  Follow-up  Make a follow-up appointment as directed by your regla healthcare provider.   When to return for re-evaluation  Be seen again right away if your child has any of the following:    Develops difficulty or pain when moving the joints above or below the infected area    Develops discharge or pus draining from the area    Fever of 100.4 F (38 C) or higher    Shaking chills    Pain or redness that gets worse in or around the infected area, especially if the area of redness gets larger    Worsening swelling in the affected area    Vomiting   Date Last Reviewed: 8/1/2016 2000-2016 [employer]. [address] [place], [place] [id number]. All rights reserved. This information is not intended as a substitute for professional medical care. Always follow your healthcare professional's instructions.                Subjective:         History provided by mother  Sudeep Aguayo is a 17 y.o. male with history of OCD who presents for evaluation of a possible skin infection located on the right index finger. Onset of symptoms was gradual starting 1 week ago, with gradually worsening course since that time. Symptoms include mild pain and erythema located at the fingernail of the right index finger. Patient denies chills and fever greater than 100. There is a history of trauma to the area after patient removed a hangnail. Treatment to date has included no medications.    The following portions of the patient's history were reviewed and updated as appropriate: allergies, current medications and problem list.    Review of Systems  Pertinent items are noted in HPI.    Allergies  No Known Allergies     Objective:       /72 (Patient Site: Right Arm, Patient Position: Sitting, Cuff Size: Adult Small)  Pulse 64  Temp 98.1  F (36.7  C) (Oral)   Resp 22  Wt 94 lb 14.4 oz (43 kg)  SpO2 98%  General appearance: alert, appears stated age, cooperative, no distress  and non-toxic  Extremities: right hand index finger with peeled skin, no other trauma; FROM of all joints  Skin: erythema, swelling, tenderness, and removed skin from right index finger distal to the DIP joint and surrodning the nail  Neurologic: sensation to light touch intact

## 2021-06-29 NOTE — PROGRESS NOTES
Progress Notes by Chinedu Hernandez PA-C at 11/3/2020  3:30 PM     Author: Chinedu Hernandez PA-C Service: -- Author Type: Physician Assistant    Filed: 11/3/2020  5:13 PM Encounter Date: 11/3/2020 Status: Signed    : Chinedu Hernandez PA-C (Physician Assistant)         Assessment & Plan:       1. Strep pharyngitis  Rapid Strep A Screen-Throat swab    predniSONE (DELTASONE) 20 MG tablet    penicillin VK (PEN VK) 500 MG tablet      Medical Decision Making  Patient presents with acute onset sore throat causing difficulties in swallowing food.  Physical exam shows significant swelling of the right tonsil concerning for possible beginnings of developing a peritonsillar abscess, however uvula is midline at this time and patient is still able to open mouth.  He is still tolerating fluids appropriately denies shortness of breath.  Will treat patient with oral antibiotics, as well as a single dose of oral steroids to help with tonsillar swelling.  Strongly emphasized close monitoring of symptoms, and if symptoms worsen to immediately present to the emergency room for further evaluation.  Otherwise follow-up if no symptom improvement in 3 days.  Recommended patient continue to self isolate until COVID-19 test results returned.    Protective precautions were taken which included a facemask, face shield, gown, and gloves.    Patient Instructions   Your rapid strep test was positive today. We will treat with a course of antibiotics. Please complete the full course of antibiotics. You can take your medication with food and with a probiotic such as Culturelle to prevent stomach irritation. You will be contagious for 24 hours following initiation of the medication.    You may use Tylenol or Motrin for pain and fevers.    May drink warm tea, gargle saline solution, or use throat lozenges to sooth throat pain.    Change toothbrush after 48 hours of starting the antibiotic to prevent reinfection.    Watch for resolution  of symptoms in the next few days. If you continue to have high fevers, begin to have difficulty swallowing or breathing, notice worsening neck pain, or difficulty moving neck, please return to clinic or present to the emergency room immediately. Otherwise, follow up with your primary care provider as needed.    Patient Education     Peritonsillar Abscess  You (or your child) has an abscess (collection of pus) around the tonsils. This abscess can cause severe sore throat, pain with swallowing, fever, drooling, and trouble opening the mouth.  The abscess is treated with antibiotics. These may be started using an IV (intravenous line), then continued by mouth. In most cases, the abscess will also be drained.  Home care    All of the antibiotics should be taken as prescribed until they are gone. This is true even if symptoms start to get better. This is very important to ensure that the infection goes away. This infection can lead to serious complications.    Pain medicines should be taken as directed.    To help ease pain, children older than 6 years and adults can gargle with warm salt water four times a day for the first 2 days. Dissolve 1/2 teaspoon of salt in 1 glass of hot water. Gargle with the solution then spit it out. (Ensure that children do not swallow the salt water.)    Cool liquids and soft foods may make eating easier for the first few days.  Follow-up care  Follow up with a healthcare provider or as advised within 1-2 days.   When to seek medical advice  Call the healthcare provider right away if any of the following occur:    Fever of 100.4 F (38 C) or higher after 3 days of treatment    Symptoms that get worse    Symptoms that go away and come back    Trouble swallowing liquids or taking medicine    Trouble breathing    Neck stiffness    Bleeding    Rash    Swelling or bumps in the neck  Date Last Reviewed: 10/1/2017    4590-0042 [employer]. [address] [place] [address], [place] [address].  All rights reserved. This information is not intended as a substitute for professional medical care. Always follow your healthcare professional's instructions.                 Subjective:       History provided by the patient.  He is also here with his father.  Sudeep Aguayo is a 20 y.o. male here for evaluation of throat pain.  Onset of symptoms was 2 days ago with acute onset and no improvement.  Associated symptoms include difficulty swallowing food.  Patient is still tolerating fluids.  No known fevers, cough, or shortness of breath.  Patient has a COVID-19 test and is waiting for the results.    The following portions of the patient's history were reviewed and updated as appropriate: allergies, current medications and problem list.    Review of Systems  Pertinent items are noted in HPI.     Allergies  No Known Allergies    Family History   Problem Relation Age of Onset   ? ADD / ADHD Cousin         paternal cousin   ? Diabetes type II Maternal Grandmother    ? Cancer Paternal Grandfather        Social History     Socioeconomic History   ? Marital status: Single     Spouse name: None   ? Number of children: None   ? Years of education: None   ? Highest education level: None   Occupational History   ? None   Social Needs   ? Financial resource strain: None   ? Food insecurity     Worry: None     Inability: None   ? Transportation needs     Medical: None     Non-medical: None   Tobacco Use   ? Smoking status: Never Smoker   ? Smokeless tobacco: Never Used   ? Tobacco comment: no vaping   Substance and Sexual Activity   ? Alcohol use: Never     Frequency: Never   ? Drug use: Never   ? Sexual activity: None   Lifestyle   ? Physical activity     Days per week: None     Minutes per session: None   ? Stress: None   Relationships   ? Social connections     Talks on phone: None     Gets together: None     Attends Hinduism service: None     Active member of club or organization: None     Attends meetings of clubs or  organizations: None     Relationship status: None   ? Intimate partner violence     Fear of current or ex partner: None     Emotionally abused: None     Physically abused: None     Forced sexual activity: None   Other Topics Concern   ? None   Social History Narrative    Lives with mom, dad, older half sister, and older sister.         Objective:       /75 (Patient Site: Right Arm, Patient Position: Sitting, Cuff Size: Adult Regular)   Pulse 94   Temp 99  F (37.2  C) (Oral)   Resp 16   Wt 108 lb (49 kg)   SpO2 96%   BMI 18.98 kg/m    General appearance: alert, appears stated age, cooperative, no distress and non-toxic  Head: Normocephalic, without obvious abnormality, atraumatic  Throat: Severe right tonsil swelling worse than left with significant erythema, uvula is midline, no exudate, no obvious peritonsillar abscess, lips appear dry and cracked, tongue is normal  Neck: moderate anterior cervical adenopathy and supple, symmetrical, trachea midline     Lab Results    Recent Results (from the past 24 hour(s))   Rapid Strep A Screen-Throat swab    Specimen: Throat   Result Value Ref Range    Rapid Strep A Antigen Group A Strep detected (!) No Group A Strep detected, presumptive negative     I personally reviewed these results and discussed findings with the patient.

## 2021-06-29 NOTE — PROGRESS NOTES
Progress Notes by Maximilian Correa MD at 8/3/2020  1:20 PM     Author: Maximilian Correa MD Service: -- Author Type: Physician    Filed: 8/4/2020  8:18 AM Encounter Date: 8/3/2020 Status: Signed    : Maximilian Correa MD (Physician)       MALE PREVENTATIVE EXAM    Assessment and Plan:       1. Routine medical exam    Generally the patient is doing very well.  We discussed healthy lifestyles, including adequate exercise (3-4 times a week for 20-30 minutes), and a healthy diet.  Patient should return for annual physicals, and we can also see them here as needed.       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  He has a short stature which is seen with this disorder but he is actually taller than his mother so to me it is unclear how much of it is genetic and how much of it really is related to this syndrome.  His heart exam sounds fine today and there is not been really a discussion of doing echocardiograms on him but at some point we may want to start doing that just to take a look at the valves and make sure the structure of the heart is doing fine.  We could consider that in the next couple of years to start that.  Otherwise he seems to be doing pretty well.    3. Anxiety state, unspecified    The guanfacine seems to be working for him he takes it in the morning rather than at night but does not make him all that sleepy.  It seems to focus him a bit and keep his behavior at a normal pace.  We will did redo refills of that for him as he does take that every day.    He does not take the methylphenidate anymore since he is out in school and I did just mention to mom that we can restart it if it becomes a necessary thing but for now they are going to keep him off of it.      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 Delayed Developmental Milestones          Next follow up:  No follow-ups on  file.    Immunization Review  Adult Imm Review: No immunizations due today      I discussed the following with the patient:   Adult Healthy Living: Importance of regular exercise  Healthy nutrition  Getting adequate sleep  Stress management  Use of seat belts        Subjective:   Chief Complaint: Sudeep Aguayo is an 20 y.o. male here for a preventative health visit.     HPI: He is transitioning over to adult medicine and is seen 1 of my colleagues over in [place] in the past.  He is doing overall pretty well and mom has no major concerns or issues with him today that is not a chronic issue.    He has this Coffin-Lawry syndrome.  He is actually doing pretty well with this.  He has some fluency troubles associated with this and some developmental delay and short stature but now that he is gotten through his adolescence he seems to be pretty stable with it.  As mentioned above there is not really been a big discussion of looking at his heart with an echocardiogram as that is one thing that sometimes is mentioned in the literature.  We can consider doing that going forward.    He does have some ADHD and behavioral issues associated with this but he is not on the methylphenidate right now since he is not in school but they do give him the guanfacine every day and that seems to work out well for him.  They have no other major concerns in regards to the syndrome or his overall health.    Healthy Habits  Are you taking a daily aspirin? No  Do you typically exercising at least 40 min, 3-4 times per week?  Yes  Do you usually eat at least 4 servings of fruit and vegetables a day, include whole grains and fiber and avoid regularly eating high fat foods? NO  Have you had an eye exam in the past two years? Yes  Do you see a dentist twice per year? Yes  Do you have any concerns regarding sleep? No    Safety Screen  If you own firearms, are they secured in a locked gun cabinet or with trigger locks? The patient declines to  "answer  Do you feel you are safe where you are living?: Yes (1/23/2020  8:58 AM)  Do you feel you are safe in your relationship(s)?: Yes (1/23/2020  8:58 AM)      Review of Systems:  Please see above.  The rest of the review of systems are negative for all systems.     Cancer Screening     Patient has no health maintenance due at this time          Patient Care Team:  Chelsi Aguiar MD as PCP - General (Family Medicine)  Klarissa Flores CNP as Assigned PCP        History     Reviewed By Date/Time Sections Reviewed    Maximilian Correa MD 8/3/2020  1:26 PM Medical, Surgical, Tobacco, Alcohol, Drug Use, Sexual Activity, Family    Jett, Miesha LERNER CMA 8/3/2020  1:08 PM Tobacco            Objective:   Vital Signs:   Visit Vitals  BP (!) 88/62 (Patient Site: Left Arm, Patient Position: Sitting, Cuff Size: Adult Regular)   Pulse 80   Ht 5' 3.25\" (1.607 m)   Wt 110 lb 3.2 oz (50 kg)   SpO2 97%   BMI 19.37 kg/m           PHYSICAL EXAM    Well developed, well nourished, no acute distress.  He has the fluency associated with this makes it a bit difficult to understand him but overall I do not have too much trouble.  He has had a short stature.  But overall on his growth curves he fits a fairly nice curve as they did give him some human growth hormone to stimulate his growth a bit.  HEENT: normocephalic/atraumatic, PERRLA/EOMI, TMs: Gray, normal light reflex, no nasal discharge.  Oral mucosa: no erythema/exudate  Neck: No LAD/masses/thyromegaly/bruits  Lungs: clear bilaterally  Heart: regular rate and rhythm, no murmurs/gallops/rubs.  Abdomen: Normal bowel sounds, soft, non-tender, non-distended, no masses, neg Deleon's/McBurney's, no rebound/guarding  Lymphatics: no supraclavicular/axillary/epitrochlear/inguinal LAD. No edema.  Neuro: A&O x 3, CN II-XII intact, strength 5/5, reflexes symmetric, sensory intact to light touch.    Musculoskeletal: no gross deformities.  Skin: no rashes or lesions.            Medication List "          Accurate as of August 3, 2020 11:59 PM. If you have any questions, ask your nurse or doctor.            CHANGE how you take these medications    guanFACINE 1 mg Tb24  INSTRUCTIONS:  TK 1 T PO QAM  What changed:  Another medication with the same name was removed. Continue taking this medication, and follow the directions you see here.  Changed by:  Maximilian Correa MD        methylphenidate HCl 30 MG CR capsule  Also known as:  METADATE CD  What changed:  Another medication with the same name was removed. Continue taking this medication, and follow the directions you see here.  Changed by:  Maximilian Correa MD           STOP taking these medications    FLUoxetine 10 MG tablet  Also known as:  PROzac  Stopped by:  Maximilian Correa MD     levothyroxine 25 MCG tablet  Also known as:  SYNTHROID, LEVOTHROID  Stopped by:  Maximilian Correa MD           Where to Get Your Medications      These medications were sent to [id number] - [place], [place] - [address] AT [facility] [address] [facility]  [address] [facility] [id number]    Hours:  24-hours Phone:  [phone]     guanFACINE 1 mg Tb24         Additional Screenings Completed Today:

## 2021-09-29 DIAGNOSIS — F41.1 ANXIETY STATE: Primary | ICD-10-CM

## 2021-10-01 RX ORDER — GUANFACINE 1 MG/1
TABLET, EXTENDED RELEASE ORAL
Qty: 90 TABLET | Refills: 3 | Status: SHIPPED | OUTPATIENT
Start: 2021-10-01 | End: 2023-03-29

## 2021-10-01 NOTE — TELEPHONE ENCOUNTER
Routing refill request to provider for review/approval because:  Drug not on the [facility] Refill Protocol    Last Written Prescription Date:  ???  Last Fill Quantity: ???,  # refills: ???   Last office visit provider:  8/3/20 with Dr. Correa     Requested Prescriptions   Pending Prescriptions Disp Refills     guanFACINE (INTUNIV) 1 MG TB24 24 hr tablet [Pharmacy Med Name: GUANFACINE ER 1MG TABLETS] 90 tablet      Sig: TAKE 1 TABLET BY MOUTH EVERY MORNING       There is no refill protocol information for this order          Claire Long RN 09/30/21 11:40 PM

## 2021-12-15 DIAGNOSIS — M26.9 DENTOFACIAL ANOMALY: Primary | ICD-10-CM

## 2022-02-14 ENCOUNTER — OFFICE VISIT (OUTPATIENT)
Dept: FAMILY MEDICINE | Facility: CLINIC | Age: 22
End: 2022-02-14
Payer: COMMERCIAL

## 2022-02-14 VITALS
TEMPERATURE: 97.9 F | BODY MASS INDEX: 20.98 KG/M2 | OXYGEN SATURATION: 96 % | DIASTOLIC BLOOD PRESSURE: 60 MMHG | HEIGHT: 63 IN | WEIGHT: 118.4 LBS | SYSTOLIC BLOOD PRESSURE: 104 MMHG | HEART RATE: 111 BPM

## 2022-02-14 DIAGNOSIS — E03.9 HYPOTHYROIDISM, UNSPECIFIED TYPE: ICD-10-CM

## 2022-02-14 DIAGNOSIS — F70 MILD INTELLECTUAL DISABILITY: ICD-10-CM

## 2022-02-14 DIAGNOSIS — J34.89 NASAL DRAINAGE: ICD-10-CM

## 2022-02-14 DIAGNOSIS — Z00.00 ROUTINE GENERAL MEDICAL EXAMINATION AT A HEALTH CARE FACILITY: Primary | ICD-10-CM

## 2022-02-14 DIAGNOSIS — Q89.8 COFFIN-LOWRY SYNDROME: ICD-10-CM

## 2022-02-14 DIAGNOSIS — Z23 HIGH PRIORITY FOR 2019-NCOV VACCINE: ICD-10-CM

## 2022-02-14 DIAGNOSIS — F89 NEURODEVELOPMENTAL DISORDER: ICD-10-CM

## 2022-02-14 PROBLEM — F06.4 ANXIETY DISORDER DUE TO GENERAL MEDICAL CONDITION: Status: ACTIVE | Noted: 2022-02-14

## 2022-02-14 PROBLEM — R62.52 SHORT STATURE DISORDER: Status: ACTIVE | Noted: 2022-02-14

## 2022-02-14 PROBLEM — F90.9 ATTENTION DEFICIT HYPERACTIVITY DISORDER (ADHD): Status: ACTIVE | Noted: 2022-02-14

## 2022-02-14 PROBLEM — E03.8 SUBCLINICAL HYPOTHYROIDISM: Status: ACTIVE | Noted: 2017-06-20

## 2022-02-14 PROBLEM — R62.0 DELAYED DEVELOPMENTAL MILESTONES: Status: ACTIVE | Noted: 2022-02-14

## 2022-02-14 LAB — HGB BLD-MCNC: 15.6 G/DL (ref 13.3–17.7)

## 2022-02-14 PROCEDURE — 36415 COLL VENOUS BLD VENIPUNCTURE: CPT | Performed by: FAMILY MEDICINE

## 2022-02-14 PROCEDURE — 91305 COVID-19,PF,PFIZER (12+ YRS): CPT | Performed by: FAMILY MEDICINE

## 2022-02-14 PROCEDURE — 99395 PREV VISIT EST AGE 18-39: CPT | Mod: 25 | Performed by: FAMILY MEDICINE

## 2022-02-14 PROCEDURE — 85018 HEMOGLOBIN: CPT | Performed by: FAMILY MEDICINE

## 2022-02-14 PROCEDURE — 84443 ASSAY THYROID STIM HORMONE: CPT | Performed by: FAMILY MEDICINE

## 2022-02-14 PROCEDURE — 82947 ASSAY GLUCOSE BLOOD QUANT: CPT | Performed by: FAMILY MEDICINE

## 2022-02-14 PROCEDURE — 0054A COVID-19,PF,PFIZER (12+ YRS): CPT | Performed by: FAMILY MEDICINE

## 2022-02-14 PROCEDURE — 80061 LIPID PANEL: CPT | Performed by: FAMILY MEDICINE

## 2022-02-14 RX ORDER — RISPERIDONE 0.5 MG/1
0.5 TABLET ORAL AT BEDTIME
COMMUNITY
Start: 2022-01-24 | End: 2023-03-29

## 2022-02-14 RX ORDER — FLUTICASONE PROPIONATE 50 MCG
1 SPRAY, SUSPENSION (ML) NASAL DAILY
Qty: 16 G | Refills: 4 | Status: SHIPPED | OUTPATIENT
Start: 2022-02-14 | End: 2022-10-13

## 2022-02-14 ASSESSMENT — PATIENT HEALTH QUESTIONNAIRE - PHQ9
10. IF YOU CHECKED OFF ANY PROBLEMS, HOW DIFFICULT HAVE THESE PROBLEMS MADE IT FOR YOU TO DO YOUR WORK, TAKE CARE OF THINGS AT HOME, OR GET ALONG WITH OTHER PEOPLE: NOT DIFFICULT AT ALL
SUM OF ALL RESPONSES TO PHQ QUESTIONS 1-9: 9
SUM OF ALL RESPONSES TO PHQ QUESTIONS 1-9: 9

## 2022-02-14 ASSESSMENT — MIFFLIN-ST. JEOR: SCORE: 1448.31

## 2022-02-14 NOTE — LETTER
February 15, 2022      Sudeep Aguayo  [address] [facility] [id number]        Dear ,    We are writing to inform you of your test results.    Your test results fall within the expected range(s) or remain unchanged from previous results.  Please continue with current treatment plan.    Resulted Orders   Hemoglobin   Result Value Ref Range    Hemoglobin 15.6 13.3 - 17.7 g/dL   Glucose   Result Value Ref Range    Glucose 89 70 - 125 mg/dL    Patient Fasting > 8hrs? No    TSH with free T4 reflex   Result Value Ref Range    TSH 2.21 0.30 - 5.00 uIU/mL   Lipid panel reflex to direct LDL Non-fasting   Result Value Ref Range    Cholesterol 192 <=199 mg/dL    Triglycerides 125 <=149 mg/dL    Direct Measure HDL 48 >=40 mg/dL      Comment:      HDL Cholesterol Reference Range:     0-2 years:   No reference ranges established for patients under 2 years old  at [facility] for lipid analytes.    2-8 years:  Greater than 45 mg/dL     18 years and older:   Female: Greater than or equal to 50 mg/dL   Male:   Greater than or equal to 40 mg/dL    LDL Cholesterol Calculated 119 <=129 mg/dL    Patient Fasting > 8hrs? No        If you have any questions or concerns, please call the clinic at the number listed above.       Sincerely,      Chelsi Aguiar MD

## 2022-02-14 NOTE — PROGRESS NOTES
SUBJECTIVE:   CC: Sudeep Aguayo is an 21 year old male who presents for preventative health visit.     Patient has been advised of split billing requirements and indicates understanding: Yes  HPI        PROBLEMS TO ADD ON...  Would like referral to counselor- Steve does not have availability- recommended [facility]  - perseverates- has a short acting medication but not sure of name  - parents getting guardianship  - Project Search- through school district for job training  - under bite- pursuing oral surgery  - nasal drainage    Today's PHQ-2 Score:   PHQ-2 ( 1999 [employer]) 2/14/2022   Q1: Little interest or pleasure in doing things 3   Q2: Feeling down, depressed or hopeless 0   PHQ-2 Score 3   Q1: Little interest or pleasure in doing things Nearly every day   Q2: Feeling down, depressed or hopeless Not at all   PHQ-2 Score 3       Abuse: Current or Past(Physical, Sexual or Emotional)- No  Do you feel safe in your environment? Yes    Have you ever done Advance Care Planning? (For example, a Health Directive, POLST, or a discussion with a medical provider or your loved ones about your wishes): No, advance care planning information given to patient to review.  Patient declined advance care planning discussion at this time.    Social History     Tobacco Use     Smoking status: Never Smoker     Smokeless tobacco: Never Used     Tobacco comment: no vaping   Substance Use Topics     Alcohol use: Never     If you drink alcohol do you typically have >3 drinks per day or >7 drinks per week? No    Alcohol Use 2/14/2022   Prescreen: >3 drinks/day or >7 drinks/week? Not Applicable   Prescreen: >3 drinks/day or >7 drinks/week? -   No flowsheet data found.    Last PSA: No results found for: PSA    Reviewed orders with patient. Reviewed health maintenance and updated orders accordingly - Yes  BP Readings from Last 3 Encounters:   02/14/22 104/60   11/03/20 115/75   08/03/20 (!) 88/62    Wt Readings from Last 3 Encounters:   02/14/22  53.7 kg (118 lb 6.4 oz)   11/03/20 49 kg (108 lb) (<1 %, Z= -2.66)*   08/03/20 50 kg (110 lb 3.2 oz) (<1 %, Z= -2.45)*     * Growth percentiles are based on [facility] (Boys, 2-20 Years) data.                  Patient Active Problem List   Diagnosis     Hypothyroidism     Anxiety disorder due to general medical condition     Anxiety state     Attention deficit hyperactivity disorder (ADHD)     Coffin-Hudson syndrome     Delayed developmental milestones     Mild intellectual disability     Neurodevelopmental disorder     Short stature disorder     Vitamin D deficiency     Subclinical hypothyroidism     Past Surgical History:   Procedure Laterality Date     BIOPSY SKIN (LOCATION)       BRONCHOSCOPY FLEXIBLE AND RIGID  03/2003     ORCHIOPEXY Bilateral 11/01/2002     OTHER SURGICAL HISTORY  12/2002    Duodenal Ulcer Perforation Repair     PE TUBES       PEG TUBE PLACEMENT  12/2002     REPAIR CHOANAL ATRESIA Bilateral 2001     TONSILLECTOMY & ADENOIDECTOMY       TONSILLECTOMY, ADENOIDECTOMY, MYRINGOTOMY, INSERT TUBE BILATERAL, COMBINED Bilateral 2002    at [facility] REPAIR PERF DUOD/PUJA ULC-WND/INJ         Social History     Tobacco Use     Smoking status: Never Smoker     Smokeless tobacco: Never Used     Tobacco comment: no vaping   Substance Use Topics     Alcohol use: Never     Family History   Problem Relation Age of Onset     Attention Deficit Disorder Cousin         paternal cousin     Diabetes Type 2  Maternal Grandmother      Cancer Paternal Grandfather          Current Outpatient Medications   Medication Sig Dispense Refill     fluticasone (FLONASE) 50 MCG/ACT nasal spray Spray 1 spray into both nostrils daily 16 g 4     guanFACINE (INTUNIV) 1 MG TB24 24 hr tablet TAKE 1 TABLET BY MOUTH EVERY MORNING 90 tablet 3     risperiDONE (RISPERDAL) 0.5 MG tablet Take 0.5 mg by mouth At Bedtime       Allergies   Allergen Reactions     No Known Drug Allergies        Reviewed and updated as needed this visit by clinical  staff  Tobacco  Allergies  Meds  Problems            Reviewed and updated as needed this visit by Provider     Problems           Past Medical History:   Diagnosis Date     Choanal atresia      Choanal atresia      Choanal atresia      Chronic maxillary sinusitis      Chronic maxillary sinusitis      Chronic maxillary sinusitis      Chronic pulmonary aspiration      Chronic pulmonary aspiration      Chronic pulmonary aspiration      Chronic serous otitis media of both ears      Chronic serous otitis media of both ears      Chronic serous otitis media of both ears      Closed fracture of right distal fibula 2004    due to bone cyst     Closed fracture of right distal fibula 2004    due to bone cyst     Closed fracture of right distal fibula 2004    due to bone cyst     Cryptorchidism      Cryptorchidism      Cryptorchidism      Disorder of stomach function and feeding problems in      MATY (gastro esoph reflux)     DU (perforated duodenal ulcer) (H) 2002     DU (perforated duodenal ulcer) (H) 2002     DU (perforated duodenal ulcer) (H) 2002     Echocardiogram abnormal 2003    small PFO     Echocardiogram abnormal 2003    small PFO     Echocardiogram abnormal 2003    small PFO     Esophageal reflux      Esophageal reflux      Esophageal reflux      Failure to thrive in childhood      FTT (failure to thrive) in child      FTT (failure to thrive) in child      FTT (failure to thrive) in child      Hypotonia      Hypotonia      Hypotonia      Lack of expected normal physiological development in childhood     Develop. delay     Lacrimal duct stenosis, bilateral      Lacrimal duct stenosis, bilateral      Lacrimal duct stenosis, bilateral      Positional plagiocephaly     had helmet from 5-8 months of age     Positional plagiocephaly     had helmet from 5-8 months of age     Positional plagiocephaly     had helmet from 5-8 months of age     S/P percutaneous endoscopic gastrostomy (PEG)  tube placement (H)      S/P percutaneous endoscopic gastrostomy (PEG) tube placement (H)      S/P percutaneous endoscopic gastrostomy (PEG) tube placement (H)      Speech delay      Speech delay      Speech delay      Torticollis      Torticollis      Torticollis      Trachea displaced     right arytenoid     Trachea displaced     right arytenoid     Trachea displaced     right arytenoid     Undescended testis      Unspecified disorder of thyroid     Thyroid disease     Unspecified otitis media 2001,2002,2003,2004,2005,    Recurrent otitis     Varicella 09/15/2006     Varicella 09/15/2006     Varicella 09/15/2006     Viral warts, unspecified      Viral warts, unspecified      Viral warts, unspecified       Past Surgical History:   Procedure Laterality Date     BIOPSY SKIN (LOCATION)       BRONCHOSCOPY FLEXIBLE AND RIGID  03/2003     ORCHIOPEXY Bilateral 11/01/2002     OTHER SURGICAL HISTORY  12/2002    Duodenal Ulcer Perforation Repair     PE TUBES       PEG TUBE PLACEMENT  12/2002     REPAIR CHOANAL ATRESIA Bilateral 2001     TONSILLECTOMY & ADENOIDECTOMY       TONSILLECTOMY, ADENOIDECTOMY, MYRINGOTOMY, INSERT TUBE BILATERAL, COMBINED Bilateral 2002    at [facility] REPAIR PERF DUOD/PUJA ULC-WND/INJ         Review of Systems  CONSTITUTIONAL: NEGATIVE for fever, chills, change in weight  INTEGUMENTARY/SKIN: NEGATIVE for worrisome rashes, moles or lesions  EYES: NEGATIVE for vision changes or irritation  ENT: NEGATIVE for ear, mouth and throat problems  RESP: NEGATIVE for significant cough or SOB  CV: NEGATIVE for chest pain, palpitations or peripheral edema  GI: NEGATIVE for nausea, abdominal pain, heartburn, or change in bowel habits   male: negative for dysuria, hematuria, decreased urinary stream, erectile dysfunction, urethral discharge  MUSCULOSKELETAL: NEGATIVE for significant arthralgias or myalgia  NEURO: NEGATIVE for weakness, dizziness or paresthesias  PSYCHIATRIC: NEGATIVE for changes in mood or  "affect    OBJECTIVE:   /60 (BP Location: Left arm, Patient Position: Sitting, Cuff Size: Adult Regular)   Pulse 111   Temp 97.9  F (36.6  C) (Temporal)   Ht 1.61 m (5' 3.39\")   Wt 53.7 kg (118 lb 6.4 oz)   SpO2 96%   BMI 20.72 kg/m      Physical Exam  GENERAL: healthy, alert and no distress  EYES: Eyes grossly normal to inspection, PERRL and conjunctivae and sclerae normal  HENT: ear canals and TM's normal, nose and mouth without ulcers or lesions  NECK: no adenopathy, no asymmetry, masses, or scars and thyroid normal to palpation  RESP: lungs clear to auscultation - no rales, rhonchi or wheezes  CV: regular rate and rhythm, normal S1 S2, no S3 or S4, no murmur, click or rub, no peripheral edema and peripheral pulses strong  ABDOMEN: soft, nontender, no hepatosplenomegaly, no masses and bowel sounds normal  MS: no gross musculoskeletal defects noted, no edema  SKIN: no suspicious lesions or rashes  NEURO: Normal strength and tone, mentation intact and speech normal  PSYCH: mentation appears normal, affect normal/bright    Diagnostic Test Results:  Labs reviewed in Epic  Results for orders placed or performed in visit on 02/14/22 (from the past 24 hour(s))   Hemoglobin   Result Value Ref Range    Hemoglobin 15.6 13.3 - 17.7 g/dL   Glucose   Result Value Ref Range    Glucose 89 70 - 125 mg/dL    Patient Fasting > 8hrs? No    TSH with free T4 reflex   Result Value Ref Range    TSH 2.21 0.30 - 5.00 uIU/mL   Lipid panel reflex to direct LDL Non-fasting   Result Value Ref Range    Cholesterol 192 <=199 mg/dL    Triglycerides 125 <=149 mg/dL    Direct Measure HDL 48 >=40 mg/dL    LDL Cholesterol Calculated 119 <=129 mg/dL    Patient Fasting > 8hrs? No        ASSESSMENT/PLAN:   Sudeep was seen today for physical, forms and imm/inj.    Diagnoses and all orders for this visit:    Routine general medical examination at a health care facility  -     Hemoglobin; Future  -     Glucose; Future  -     Lipid panel reflex " "to direct LDL Non-fasting; Future  -     Hemoglobin  -     Glucose  -     Lipid panel reflex to direct LDL Non-fasting    High priority for 2019-nCoV vaccine  -     COVID-19,PF,PFIZER (12+ Yrs GRAY LABEL)    Nasal drainage  -     fluticasone (FLONASE) 50 MCG/ACT nasal spray; Spray 1 spray into both nostrils daily    Hypothyroidism, unspecified type  -     TSH with free T4 reflex; Future  -     TSH with free T4 reflex    Coffin-Stovall syndrome    Neurodevelopmental disorder    Mild intellectual disability            COUNSELING:   Reviewed preventive health counseling, as reflected in patient instructions       Regular exercise       Healthy diet/nutrition       Vision screening    Estimated body mass index is 20.72 kg/m  as calculated from the following:    Height as of this encounter: 1.61 m (5' 3.39\").    Weight as of this encounter: 53.7 kg (118 lb 6.4 oz).         He reports that he has never smoked. He has never used smokeless tobacco.      Counseling Resources:  ATP IV Guidelines  Pooled Cohorts Equation Calculator  FRAX Risk Assessment  ICSI Preventive Guidelines  Dietary Guidelines for Americans, 2010  [employer]'s MyPlate  ASA Prophylaxis  Lung CA Screening    Chelsi Aguiar MD  [facility]  Answers for HPI/ROS submitted by the patient on 2/14/2022  If you checked off any problems, how difficult have these problems made it for you to do your work, take care of things at home, or get along with other people?: Not difficult at all  PHQ9 TOTAL SCORE: 9      "

## 2022-02-15 LAB
CHOLEST SERPL-MCNC: 192 MG/DL
FASTING STATUS PATIENT QL REPORTED: NO
FASTING STATUS PATIENT QL REPORTED: NO
GLUCOSE BLD-MCNC: 89 MG/DL (ref 70–125)
HDLC SERPL-MCNC: 48 MG/DL
LDLC SERPL CALC-MCNC: 119 MG/DL
TRIGL SERPL-MCNC: 125 MG/DL
TSH SERPL DL<=0.005 MIU/L-ACNC: 2.21 UIU/ML (ref 0.3–5)

## 2022-02-15 ASSESSMENT — PATIENT HEALTH QUESTIONNAIRE - PHQ9: SUM OF ALL RESPONSES TO PHQ QUESTIONS 1-9: 9

## 2022-08-11 ENCOUNTER — TRANSFERRED RECORDS (OUTPATIENT)
Dept: HEALTH INFORMATION MANAGEMENT | Facility: CLINIC | Age: 22
End: 2022-08-11

## 2022-09-14 DIAGNOSIS — M26.02 MAXILLARY HYPOPLASIA: Primary | ICD-10-CM

## 2022-09-19 DIAGNOSIS — M26.02 MAXILLARY HYPOPLASIA: Primary | ICD-10-CM

## 2022-10-13 ENCOUNTER — OFFICE VISIT (OUTPATIENT)
Dept: FAMILY MEDICINE | Facility: CLINIC | Age: 22
End: 2022-10-13
Payer: COMMERCIAL

## 2022-10-13 VITALS
HEIGHT: 64 IN | WEIGHT: 132 LBS | DIASTOLIC BLOOD PRESSURE: 60 MMHG | BODY MASS INDEX: 22.53 KG/M2 | SYSTOLIC BLOOD PRESSURE: 100 MMHG | HEART RATE: 76 BPM | RESPIRATION RATE: 18 BRPM | OXYGEN SATURATION: 98 %

## 2022-10-13 DIAGNOSIS — Z01.818 PRE-OP EXAM: Primary | ICD-10-CM

## 2022-10-13 DIAGNOSIS — F70 MILD INTELLECTUAL DISABILITY: ICD-10-CM

## 2022-10-13 DIAGNOSIS — F90.2 ATTENTION DEFICIT HYPERACTIVITY DISORDER (ADHD), COMBINED TYPE: ICD-10-CM

## 2022-10-13 DIAGNOSIS — R62.0 DELAYED DEVELOPMENTAL MILESTONES: ICD-10-CM

## 2022-10-13 DIAGNOSIS — E03.9 HYPOTHYROIDISM, UNSPECIFIED TYPE: ICD-10-CM

## 2022-10-13 DIAGNOSIS — F89 NEURODEVELOPMENTAL DISORDER: ICD-10-CM

## 2022-10-13 DIAGNOSIS — F06.4 ANXIETY DISORDER DUE TO GENERAL MEDICAL CONDITION: ICD-10-CM

## 2022-10-13 DIAGNOSIS — M26.02 MAXILLARY HYPOPLASIA: ICD-10-CM

## 2022-10-13 DIAGNOSIS — Q89.8 COFFIN-LOWRY SYNDROME: ICD-10-CM

## 2022-10-13 LAB — HGB BLD-MCNC: 14.9 G/DL (ref 13.3–17.7)

## 2022-10-13 PROCEDURE — 85018 HEMOGLOBIN: CPT | Performed by: NURSE PRACTITIONER

## 2022-10-13 PROCEDURE — 36415 COLL VENOUS BLD VENIPUNCTURE: CPT | Performed by: NURSE PRACTITIONER

## 2022-10-13 PROCEDURE — 99214 OFFICE O/P EST MOD 30 MIN: CPT | Performed by: NURSE PRACTITIONER

## 2022-10-13 RX ORDER — HYDROXYZINE PAMOATE 25 MG/1
CAPSULE ORAL
COMMUNITY
Start: 2022-08-08 | End: 2023-03-29

## 2022-10-13 ASSESSMENT — PAIN SCALES - GENERAL: PAINLEVEL: NO PAIN (0)

## 2022-10-13 NOTE — PROGRESS NOTES
[facility]  [address]  [facility] [address]  Phone: [phone]  Fax: [phone]  Primary Provider: Chelsi Aguiar  Pre-op Performing Provider: ARCHANA CLEMENTS      PREOPERATIVE EVALUATION:  Today's date: 10/13/2022    Sudeep Aguayo is a 22 year old male who presents for a preoperative evaluation.    Surgical Information:  Surgery/Procedure: Jaw surgery   Surgery Location: [facility] dentistry  Surgeon:   Surgery Date: 10/26/22  Time of Surgery:   Where patient plans to recover: At home with family  Fax number for surgical facility:[phone]    Type of Anesthesia Anticipated: to be determined    Pre-op exam  Preop exam performed.  There are no contraindications for surgery.  Patient is to avoid NSAIDs for 7 days prior to surgery.  - Hemoglobin  - Hemoglobin    Maxillary hypoplasia  Recommend over-the-counter acetaminophen as needed for pain.    Hypothyroidism, unspecified type  This is controlled without medication.    Attention deficit hyperactivity disorder (ADHD), combined type  Anxiety disorder due to general medical condition  Coffin-Gino syndrome  Delayed developmental milestones  Mild intellectual disability  Neurodevelopmental disorder  Patient is prescribed risperidone, guanfacine, hydroxyzine.  Patient will hold guanfacine in the morning of surgery.  May resume postop per surgeon.        Subjective     HPI related to upcoming procedure: Patient presents today with his mother.  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  He has difficulty chewing and does not articulate well with his speech.    He has hypothyroidism and is not currently taking medication.  Last TSH was 2.21 on 2/14/2022.  He has anxiety, ADHD, neurodevelopmental disorder, Coffin-Gino syndrome.  He sees psychiatry who prescribes Risperdal, hydroxyzine, and guanfacine.    Preop Questions 10/13/2022   1. Have you ever had a heart attack or stroke? No   2. Have you ever had  surgery on your heart or blood vessels, such as a stent placement, a coronary artery bypass, or surgery on an artery in your head, neck, heart, or legs? No   3. Do you have chest pain with activity? No   4. Do you have a history of  heart failure? No   5. Do you currently have a cold, bronchitis or symptoms of other infection? No   6. Do you have a cough, shortness of breath, or wheezing? No   7. Do you or anyone in your family have previous history of blood clots? No   8. Do you or does anyone in your family have a serious bleeding problem such as prolonged bleeding following surgeries or cuts? No   9. Have you ever had problems with anemia or been told to take iron pills? No   10. Have you had any abnormal blood loss such as black, tarry or bloody stools? No   11. Have you ever had a blood transfusion? No   12. Are you willing to have a blood transfusion if it is medically needed before, during, or after your surgery? Yes   13. Have you or any of your relatives ever had problems with anesthesia? No   14. Do you have sleep apnea, excessive snoring or daytime drowsiness? No   15. Do you have any artifical heart valves or other implanted medical devices like a pacemaker, defibrillator, or continuous glucose monitor? No   16. Do you have artificial joints? No   17. Are you allergic to latex? No       Health Care Directive:  Patient does not have a Health Care Directive or Living Will: Discussed advance care planning with patient; information given to patient to review.    Preoperative Review of :   reviewed - no record of controlled substances prescribed.      Status of Chronic Conditions:  See problem list for active medical problems.  Problems all longstanding and stable, except as noted/documented.  See ROS for pertinent symptoms related to these conditions.      Review of Systems  Constitutional, neuro, ENT, endocrine, pulmonary, cardiac, gastrointestinal, genitourinary, musculoskeletal, integument and  psychiatric systems are negative, except as otherwise noted.    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  delayed adaptive skills       Mild intellectual disability 02/14/2022     Priority: Medium     Neurodevelopmental disorder 02/14/2022     Priority: Medium     Short stature disorder 02/14/2022     Priority: Medium     Subclinical hypothyroidism 06/20/2017     Priority: Medium     Formatting of this note might be different from the original.  Pituitary MRI in 2/26/2003 normal. Followed by Dr. Apolinar Borrero at [facility].       Vitamin D deficiency 01/19/2016     Priority: Medium     Anxiety state 11/22/2014     Priority: Medium     Formatting of this note might be different from the original.  Replacement Utility updated for latest IMO load       Hypothyroidism      Priority: Medium     Hypothyroidism  Problem list name updated by automated process. Provider to review        Past Medical History:   Diagnosis Date     Choanal atresia      Choanal atresia      Choanal atresia      Chronic maxillary sinusitis      Chronic maxillary sinusitis      Chronic maxillary sinusitis      Chronic pulmonary aspiration      Chronic pulmonary aspiration      Chronic pulmonary aspiration      Chronic serous otitis media of both ears      Chronic serous otitis media of both ears      Chronic serous otitis media of both ears      Closed fracture of right distal fibula 11/2004    due to bone cyst     Closed fracture of right distal fibula 11/2004    due to bone cyst     Closed fracture of right distal fibula 11/2004    due to bone cyst      Cryptorchidism      Cryptorchidism      Cryptorchidism      Disorder of stomach function and feeding problems in      MATY (gastro esoph reflux)     DU (perforated duodenal ulcer) (H) 2002     DU (perforated duodenal ulcer) (H) 2002     DU (perforated duodenal ulcer) (H) 2002     Echocardiogram abnormal 2003    small PFO     Echocardiogram abnormal 2003    small PFO     Echocardiogram abnormal 2003    small PFO     Esophageal reflux      Esophageal reflux      Esophageal reflux      Failure to thrive in childhood      FTT (failure to thrive) in child      FTT (failure to thrive) in child      FTT (failure to thrive) in child      Hypotonia      Hypotonia      Hypotonia      Lack of expected normal physiological development in childhood     Develop. delay     Lacrimal duct stenosis, bilateral      Lacrimal duct stenosis, bilateral      Lacrimal duct stenosis, bilateral      Positional plagiocephaly     had helmet from 5-8 months of age     Positional plagiocephaly     had helmet from 5-8 months of age     Positional plagiocephaly     had helmet from 5-8 months of age     S/P percutaneous endoscopic gastrostomy (PEG) tube placement (H)      S/P percutaneous endoscopic gastrostomy (PEG) tube placement (H)      S/P percutaneous endoscopic gastrostomy (PEG) tube placement (H)      Speech delay      Speech delay      Speech delay      Torticollis      Torticollis      Torticollis      Trachea displaced     right arytenoid     Trachea displaced     right arytenoid     Trachea displaced     right arytenoid     Undescended testis      Unspecified disorder of thyroid     Thyroid disease     Unspecified otitis media ,,,,2005,    Recurrent otitis     Varicella 09/15/2006     Varicella 09/15/2006     Varicella 09/15/2006     Viral warts, unspecified      Viral warts, unspecified      Viral warts, unspecified      Past Surgical History:   Procedure Laterality Date     BIOPSY SKIN (LOCATION)  "      BRONCHOSCOPY FLEXIBLE AND RIGID  03/2003     ORCHIOPEXY Bilateral 11/01/2002     OTHER SURGICAL HISTORY  12/2002    Duodenal Ulcer Perforation Repair     PE TUBES       PEG TUBE PLACEMENT  12/2002     REPAIR CHOANAL ATRESIA Bilateral 2001     TONSILLECTOMY & ADENOIDECTOMY       TONSILLECTOMY, ADENOIDECTOMY, MYRINGOTOMY, INSERT TUBE BILATERAL, COMBINED Bilateral 2002    at [facility] REPAIR PERF DUOD/PUJA ULC-WND/INJ       Current Outpatient Medications   Medication Sig Dispense Refill     guanFACINE (INTUNIV) 1 MG TB24 24 hr tablet TAKE 1 TABLET BY MOUTH EVERY MORNING 90 tablet 3     hydrOXYzine (VISTARIL) 25 MG capsule TAKE 1 TO 2 CAPSULES BY MOUTH EVERY 8 HOURS AS NEEDED FOR ANXIETY       risperiDONE (RISPERDAL) 0.5 MG tablet Take 0.5 mg by mouth At Bedtime       fluticasone (FLONASE) 50 MCG/ACT nasal spray Spray 1 spray into both nostrils daily (Patient not taking: Reported on 10/13/2022) 16 g 4       Allergies   Allergen Reactions     No Known Drug Allergies         Social History     Tobacco Use     Smoking status: Never     Smokeless tobacco: Never     Tobacco comments:     no vaping   Substance Use Topics     Alcohol use: Never     Family History   Problem Relation Age of Onset     Attention Deficit Disorder Cousin         paternal cousin     Diabetes Type 2  Maternal Grandmother      Cancer Paternal Grandfather      History   Drug Use Unknown         Objective     /60 (BP Location: Left arm, Patient Position: Sitting, Cuff Size: Adult Regular)   Pulse 76   Resp 18   Ht 1.626 m (5' 4\")   Wt 59.9 kg (132 lb)   SpO2 98%   BMI 22.66 kg/m      Physical Exam    GENERAL APPEARANCE: healthy, alert and no distress     EYES: EOMI, PERRL     HENT: ear canals and TM's normal and nose and mouth without ulcers or lesions     NECK: no adenopathy, no asymmetry, masses, or scars and thyroid normal to palpation     RESP: lungs clear to auscultation - no rales, rhonchi or wheezes     CV: regular rates " and rhythm, normal S1 S2, no S3 or S4 and no murmur, click or rub     ABDOMEN:  soft, nontender, no HSM or masses and bowel sounds normal     MS: extremities normal- no gross deformities noted, no evidence of inflammation in joints, FROM in all extremities.     SKIN: no suspicious lesions or rashes     NEURO: Normal strength and tone, sensory exam grossly normal, mentation intact and speech normal     PSYCH: mentation appears normal. and affect normal/bright     LYMPHATICS: No cervical adenopathy    Recent Labs   Lab Test 02/14/22  1622   HGB 15.6        Diagnostics:  Labs pending at this time.  Results will be reviewed when available.   No EKG required for low risk surgery (cataract, skin procedure, breast biopsy, etc).    Revised Cardiac Risk Index (RCRI):  The patient has the following serious cardiovascular risks for perioperative complications:   - No serious cardiac risks = 0 points     RCRI Interpretation: 0 points: Class I (very low risk - 0.4% complication rate)           Signed Electronically by: REZA Santiago CNP  Copy of this evaluation report is provided to requesting physician.

## 2022-10-13 NOTE — LETTER
October 13, 2022      Sudeep LAITH Olivier  [address] [facility] [address]        Dear Olivier,    We are writing to inform you of your test results.    Normal lab results.       Resulted Orders   Hemoglobin   Result Value Ref Range    Hemoglobin 14.9 13.3 - 17.7 g/dL       If you have any questions or concerns, please call the clinic at the number listed above.       Sincerely,      REZA Santiago CNP

## 2022-10-20 NOTE — H&P
ORAL & MAXILLOFACIAL SURGERY   HISTORY & PHYSICAL  Sudeep Aguayo,  MRN: [id number],  : 2000    [facility]  Sudeep Aguayo is a 22 year old male past medical history significant for Coffin-Gino syndrome, hypothyroidism, GERD, developmental delay, ADHD, and anxiety, who presents for a history and physical prior to planned maxillary distraction osteogenesis surgery on 10/26/2022. 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 His overjet is -9 mm, which cannot be treated with orthognathic surgery alone. The patient reports he has difficulty biting and chewing his food, and also has some difficulty with swallowing. He has seen a speech and language pathologist, who also recommends jaw surgery to help with the patient's difficulty with swallowing and speech.     HISTORY  Past Medical History: Coffin-[place] syndrome, developmental delay, hypothyroidism, GERD, ADHD, anxiety    Past Surgical History:   1. Repair choanal atresia (2001)  2. Tonsillectomy & adenoidectomy ()  3. Myringotomy ()  4. Bilateral orchiopexy (2002)  5. PEG tube placement (2002)  6. Bronchoscopy (3/2003)  7. Repair of duodenal ulcer perforation  8. Skin biopsy     Medications: Guanfacine, hydroxyzine, risperidone, fluticasone    Allergies: NKDA    Social History: Denies alcohol, tobacco, or illicit drug use. Works at a [employer].     Family History: Denies anesthesia complications and bleeding disorders.     REVIEW OF SYSTEMS  ROS reviewed and negative aside from listed in HPI    PHYSICAL EXAM  Vital Signs:   Blood Pressure: 115/76  Pulse: 72  SpO2: 98%  Temperature: 97.8 F  Height: 5'3''  Weight: 135.8 lbs    GEN: WD/WN male, NAD  HEENT: NC/AT, EOMI, sclera white. No facial edema or tenderness to palpation. Class III skeletal-dental deformity with class III canines and molars. Overjet -9 mm, overbite 5 mm. Maxillary  tooth crowding. Poor oral hygiene with heavy plaque, gingiva erythematous. Vestibules and floor of mouth soft, non-tender, non-distended. No intraoral lesions. Tongue and uvula midline, oropharynx clear. DEMAR 40 mm. Mallampati I.  CV: RRR, no M/G/R, warm and well-perfused  PULM: CTAB, breathing comfortably on room air  MSK: JAMES, no peripheral extremity edema  NEURO: AAOx4, CN II-XII intact bilaterally  PSYCH: Appropriate mood and affect            REVIEW OF LABORATORY DATA  No new labs.    IMAGING RESULTS  CBCT (10/28/2021): Thickened mucosal linings of bilateral maxillary sinuses. Condyles seated in fossa bilaterally. Missing teeth #1, 16, 17, 32. Class III skeletal dental relationship with hypoplastic maxilla.     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 Patient has significant negative overjet that cannot be treated with orthognathic surgery alone. Indication for maxillary distraction osteogenesis. Low risk for cardiopulmonary complications with general anesthesia.     PLAN  - Discussed findings with the patient and his mother  - Discussed the surgical treatment plan for maxillary distraction osteogenesis  - Discussed the risks, benefits, and alternatives of surgery including, but not limited to bleeding, bruising, infection, swelling, pain, discomfort, osteomyelitis, osteonecrosis, malunion, nonunion, relapse, injury to nerves, vessels, teeth, and soft tissue, permanent facial numbness and weakness, permanent loss of facial movement, blindness, need for another procedure, progression of temporomandibular joint symptoms/pain, unsightly scarring, blood loss, and need for transfusion as well as general anesthesia risk including heart attacks,  stroke, and death.  - Discussed post-operative instructions  - Patient will need a COVID test within 72 hours of the surgery  - Answered all patient questions    Discussed with staff, Dr. Parviz Saunders.    Carlene Mariee DDS  Oral & Maxillofacial Surgery PGY-4

## 2022-10-20 NOTE — PRE-PROCEDURE
"ORAL & MAXILLOFACIAL SURGERY   PREOPERATIVE  NOTE:  Sudeep Aguayo,  MRN: [id number],  : 2000      Date of Procedure:   2022    Pre-Operative Diagnosis:  1. Maxillary hypoplasia   2. Facial dysmorphia      Planned Procedure:  1. Placement of Maxillary distractors  2. Placement of maxillomandibular fixation    Planned Anesthesia: General via nasal endotracheal tube    Attending Surgeon:  Dr. Saunders    Resident Surgeon:  Carlene Mariee DDS    Resident Assistants:  KAYLEE Hoffman DDS    Consent:  Written and verbal consent obtained from patient previously. Discussion included  risks/complications including but not limited post-operative pain, swelling, bleeding,  infection, hardware failure, nonunion, malunion, dehiscence of wounds,  temporary/permanent paresthesia/anesthesia of CN V3 mental nerve distribution/CN V2  maxillary nerve distribution, damage to CN VII (motor to muscles of facial expression)  with temporary or permanent paralysis, scar formation, malocclusion, failure to resolve  chief complaint, or need for additional procedures (occlusal equilibration/endodontic  therapy). Patient agrees to procedure as written, signed consent in chart.      Karla Chandler DDS  Oral & Maxillofacial Surgery, PGY-2    ----------------------------------------------------------------------------------------------------------------------------------------------  OMFS Consultation    CC: \"My teeth don't come together in the front\"    HPI: Sudeep is a 21-year-old male who presents to the Oral and Maxillofacial Surgery Department for evaluation of his skeletal-dental deformity. He was referred by his orthodontist for preorthognathic evaluation. He was asked to fill out a motivation questionnaire in which he checked the following items: He would like to make the upper front teeth fit the lower jaw. He would like to bring his front teeth together. In addition, the patient voices multiple " functional concerns regarding inability to properly bite, chew, and swallow food primarily due to inability to bring his front teeth together and states that a portion of the food is often left behind when attempting to bite into something. His mother and him are concerned about hypernasal speech, sinus congestion, and dysphagia. Patient reports occasional pain/discomfort with his periauricular area, neck, shoulders, teeth, eyes, and maxillary sinuses.    PMH: developmental delay  PSH: adenoidectomy, tonsillectomy, orchiopexy, GI surgery (ruptured GI ulcer repair), ear tube placement  Meds: guanfacine  Allergies: NKDA  Family History: Denies bleeding or anesthesia problems.  Social History: Denies tobacco, alcohol, or illicit drug use.   Tobacco:  Patient denies  EtOH: Patient denies  IDU/Marijuana:  Patient denies    ROS: A 10 point review of systems was conducted and noted to be negative for fevers/chills, nausea/vomiting, recent cough/cold, rashes, changes in vision/hearing, chest pain, shortness of breath, abdominal pain, joint/muscle aches, hematologic abnormality, seizure.      Physical Exam  General: Comfortably seated in chair, pleasant, no acute distress.  HEENT: Normocephalic, atraumatic, no extraoral masses or lesion. clicking of the bilateral temporomandibular joints, No preauricular tenderness noted, no myalgia,   Mallampati 3  Josué classification 2    Oral exam: DEMAR 38 mm. No lesions of the maxillary or mandibular vestibules/buccal mucosa. Mucosa of the hard/soft palate, tongue, floor of mouth, posterior oropharynx pink and without masses/lesions. Floor of mouth soft, non-tender, non-distended. Uvula midline. multiple dental restorations present on dentition     Neck: Soft, supple, no cervical lymphadenopathy.  CV: RRR  Resp: CTAB  Ext: Feasible IV start    Neuro: AO x 3    HEENT:  Facial exam reveals the following measurements:  1. Upper facial third mm: 50  2. Middle facial third mm: 72  3. Lower  facial third mm: 66  4. Upper lower third mm: 25  5. Lower lower two-thirds mm: 41  6. Scleral show mm; 0  7. Infraorbital rim projection 0 mm.  8. Chin is deviated left by 4 mm in relation to midsagittal plane.  9. Chin is normal  10. Labiomental fold is normal.    Lip - tooth measurements:  1. Upper lip length 9 mm.  2. Interlabial distance is 3 mm.  3. Upper incisor show at rest 0 mm.  4. Gingival show at rest 0 mm.  5. Upper incisors show with animation 7 mm.  6. Gingival show with animation 0 mm.    Nasal Exam:  1. Dorsal nasal projection 8 mm.  2. Mild dorsal concavity.  3. Nasal septum no deviation   4. Nasal tip deviation to left by 5mm  5. Alar base 45 mm.  6. Normal nasolabial angle.  7. Columella show is 4 mm below alar rims.    Dental Examination:  1. Right canine to right medial canthus distance 70 mm.  2. Left canine to left medial canthus distance is 66 mm.  3. Occlusal cant 1 mm.  4. Maxillary midline to midsagittal plane 2 mm to left.  5. Mandibular midline to maxillary midline no deviation.  6. Mandibular midline to menton 1 mm to the left  7. Mentum midline to midsagittal plane 3 mm to left.  8. The right angle classification class III, both canine and molar.  9. The left angle classification class III, both canine and molar.  10. Overjet is -9 mm.  11. Overbite is  5 mm.    Temporomandibular Joint Exam:  1. Maximum interincisal distance 38 mm.  2. Lateral excursive movements:  Right 7 mm, left 7 mm.  3. Protrusive movement 5 mm.  4. Palpation of the joint  reveals no pain/ discomfort, there is bilateral clicking    Imaging: [facility] imaging independently reviewed: TMJ in view bilaterally in fossae and is grossly symmetric, maxillary sinuses are not radiolucent bilaterally.  No carotid calcifications noted. Bony pathology not noted. Inferior border of the mandible continuous. NO obvious osseus pathology appreciated. Adult dentiton with multiple dental restorations present. impacted #32, missing #1 16  17     Assessment:  22 y/o male, ASA II, with skeletal dental deformity resulting in class III malocclusion and disturbances in tooth eruption resulting in impacted #32     Plan: The procedure has been discussed with the patient. We have discussed the risk, benefits, and alternatives of maxillary and mandibular surgery including, but not limited to bleeding, bruising, infection, swelling, pain, discomfort, osteomyelitis, osteonecrosis, malunion, nonunion, relapse, injury to nerves, vessels, teeth, and soft tissue, permanent facial numbness and weakness including permanent lip and chin numbness, permanent tongue numbness, permanent loss of face, blindness, need for another procedure, progression of temporomandibular joint symptoms/pain, unsightly scarring, blood loss, and need for transfusion as well as general anesthesia risk including heart attacks, stroke, and death.    They had the opportunity to ask questions which were answered to their satisfaction. This discussion will be repeated at additional preoperative appointments.    The proposed treatment plan will be discussed with the patient's orthodontist and once the orthodontic movements are finalized (dental decompensation, leveling and alignment of the arches), we will proceed with serial casts and models to determine the final surgical plans which may include maxillary, mandibular, or double jaw surgeries. The final model surgery and preoperative measurements will be done at a later time when the patient is ready for surgery.    -Release of medical records signed by patient and records requested by Sanket  -Referral sent for speech language to Dr. Laura Quijano  -Extract #32 under GA in clinic next

## 2022-10-26 ENCOUNTER — ANESTHESIA (OUTPATIENT)
Dept: SURGERY | Facility: CLINIC | Age: 22
End: 2022-10-26
Payer: COMMERCIAL

## 2022-10-26 ENCOUNTER — HOSPITAL ENCOUNTER (OUTPATIENT)
Facility: CLINIC | Age: 22
Discharge: HOME | End: 2022-10-26
Attending: DENTIST | Admitting: DENTIST
Payer: COMMERCIAL

## 2022-10-26 ENCOUNTER — ANESTHESIA EVENT (OUTPATIENT)
Dept: SURGERY | Facility: CLINIC | Age: 22
End: 2022-10-26
Payer: COMMERCIAL

## 2022-10-26 VITALS
TEMPERATURE: 99.2 F | RESPIRATION RATE: 14 BRPM | BODY MASS INDEX: 22.51 KG/M2 | DIASTOLIC BLOOD PRESSURE: 59 MMHG | HEIGHT: 64 IN | OXYGEN SATURATION: 99 % | SYSTOLIC BLOOD PRESSURE: 103 MMHG | WEIGHT: 131.84 LBS | HEART RATE: 96 BPM

## 2022-10-26 DIAGNOSIS — M26.02 MAXILLARY HYPOPLASIA: Primary | ICD-10-CM

## 2022-10-26 PROCEDURE — 258N000003 HC RX IP 258 OP 636: Performed by: STUDENT IN AN ORGANIZED HEALTH CARE EDUCATION/TRAINING PROGRAM

## 2022-10-26 PROCEDURE — 272N000001 HC OR GENERAL SUPPLY STERILE: Performed by: DENTIST

## 2022-10-26 PROCEDURE — 710N000010 HC RECOVERY PHASE 1, LEVEL 2, PER MIN: Performed by: DENTIST

## 2022-10-26 PROCEDURE — 82962 GLUCOSE BLOOD TEST: CPT

## 2022-10-26 PROCEDURE — 272N000002 HC OR SUPPLY OTHER OPNP: Performed by: DENTIST

## 2022-10-26 PROCEDURE — 250N000013 HC RX MED GY IP 250 OP 250 PS 637: Performed by: DENTIST

## 2022-10-26 PROCEDURE — 250N000011 HC RX IP 250 OP 636: Performed by: DENTIST

## 2022-10-26 PROCEDURE — 710N000012 HC RECOVERY PHASE 2, PER MINUTE: Performed by: DENTIST

## 2022-10-26 PROCEDURE — 250N000025 HC SEVOFLURANE, PER MIN: Performed by: DENTIST

## 2022-10-26 PROCEDURE — C1713 ANCHOR/SCREW BN/BN,TIS/BN: HCPCS | Performed by: DENTIST

## 2022-10-26 PROCEDURE — 250N000009 HC RX 250: Performed by: DENTIST

## 2022-10-26 PROCEDURE — 250N000009 HC RX 250: Performed by: NURSE ANESTHETIST, CERTIFIED REGISTERED

## 2022-10-26 PROCEDURE — 250N000009 HC RX 250: Performed by: STUDENT IN AN ORGANIZED HEALTH CARE EDUCATION/TRAINING PROGRAM

## 2022-10-26 PROCEDURE — 360N000077 HC SURGERY LEVEL 4, PER MIN: Performed by: DENTIST

## 2022-10-26 PROCEDURE — 370N000017 HC ANESTHESIA TECHNICAL FEE, PER MIN: Performed by: DENTIST

## 2022-10-26 PROCEDURE — 250N000011 HC RX IP 250 OP 636: Performed by: STUDENT IN AN ORGANIZED HEALTH CARE EDUCATION/TRAINING PROGRAM

## 2022-10-26 PROCEDURE — 999N000141 HC STATISTIC PRE-PROCEDURE NURSING ASSESSMENT: Performed by: DENTIST

## 2022-10-26 PROCEDURE — 250N000011 HC RX IP 250 OP 636: Performed by: NURSE ANESTHETIST, CERTIFIED REGISTERED

## 2022-10-26 DEVICE — IMPLANTABLE DEVICE: Type: IMPLANTABLE DEVICE | Site: MAXILLA | Status: FUNCTIONAL

## 2022-10-26 DEVICE — IMPLANTABLE DEVICE
Type: IMPLANTABLE DEVICE | Site: MAXILLA | Status: NON-FUNCTIONAL
Removed: 2023-04-04

## 2022-10-26 DEVICE — IMP SCR SYN MATRIX 1.85X6MM SELF DRILL 04.511.226.01
Type: IMPLANTABLE DEVICE | Site: MAXILLA | Status: NON-FUNCTIONAL
Removed: 2023-04-04

## 2022-10-26 RX ORDER — OXYCODONE HYDROCHLORIDE 5 MG/1
5 TABLET ORAL EVERY 4 HOURS PRN
Status: DISCONTINUED | OUTPATIENT
Start: 2022-10-26 | End: 2022-10-27 | Stop reason: HOSPADM

## 2022-10-26 RX ORDER — ONDANSETRON 4 MG/1
4 TABLET, ORALLY DISINTEGRATING ORAL EVERY 30 MIN PRN
Status: DISCONTINUED | OUTPATIENT
Start: 2022-10-26 | End: 2022-10-26 | Stop reason: HOSPADM

## 2022-10-26 RX ORDER — ONDANSETRON 2 MG/ML
4 INJECTION INTRAMUSCULAR; INTRAVENOUS EVERY 30 MIN PRN
Status: DISCONTINUED | OUTPATIENT
Start: 2022-10-26 | End: 2022-10-26 | Stop reason: HOSPADM

## 2022-10-26 RX ORDER — PROPOFOL 10 MG/ML
INJECTION, EMULSION INTRAVENOUS PRN
Status: DISCONTINUED | OUTPATIENT
Start: 2022-10-26 | End: 2022-10-26

## 2022-10-26 RX ORDER — ONDANSETRON 2 MG/ML
INJECTION INTRAMUSCULAR; INTRAVENOUS PRN
Status: DISCONTINUED | OUTPATIENT
Start: 2022-10-26 | End: 2022-10-26

## 2022-10-26 RX ORDER — OXYMETAZOLINE HYDROCHLORIDE 0.05 G/100ML
2 SPRAY NASAL 2 TIMES DAILY
Qty: 15 ML | Refills: 0 | Status: SHIPPED | OUTPATIENT
Start: 2022-10-26 | End: 2022-10-29

## 2022-10-26 RX ORDER — CEFAZOLIN SODIUM/WATER 2 G/20 ML
2 SYRINGE (ML) INTRAVENOUS
Status: COMPLETED | OUTPATIENT
Start: 2022-10-26 | End: 2022-10-26

## 2022-10-26 RX ORDER — BUPIVACAINE HYDROCHLORIDE AND EPINEPHRINE 2.5; 5 MG/ML; UG/ML
INJECTION, SOLUTION INFILTRATION; PERINEURAL PRN
Status: DISCONTINUED | OUTPATIENT
Start: 2022-10-26 | End: 2022-10-26 | Stop reason: HOSPADM

## 2022-10-26 RX ORDER — FENTANYL CITRATE 50 UG/ML
INJECTION, SOLUTION INTRAMUSCULAR; INTRAVENOUS PRN
Status: DISCONTINUED | OUTPATIENT
Start: 2022-10-26 | End: 2022-10-26

## 2022-10-26 RX ORDER — LIDOCAINE HYDROCHLORIDE 20 MG/ML
INJECTION, SOLUTION INFILTRATION; PERINEURAL PRN
Status: DISCONTINUED | OUTPATIENT
Start: 2022-10-26 | End: 2022-10-26

## 2022-10-26 RX ORDER — SODIUM CHLORIDE, SODIUM LACTATE, POTASSIUM CHLORIDE, CALCIUM CHLORIDE 600; 310; 30; 20 MG/100ML; MG/100ML; MG/100ML; MG/100ML
INJECTION, SOLUTION INTRAVENOUS CONTINUOUS
Status: DISCONTINUED | OUTPATIENT
Start: 2022-10-26 | End: 2022-10-26 | Stop reason: HOSPADM

## 2022-10-26 RX ORDER — LIDOCAINE 40 MG/G
CREAM TOPICAL
Status: DISCONTINUED | OUTPATIENT
Start: 2022-10-26 | End: 2022-10-26 | Stop reason: HOSPADM

## 2022-10-26 RX ORDER — FENTANYL CITRATE 50 UG/ML
25 INJECTION, SOLUTION INTRAMUSCULAR; INTRAVENOUS EVERY 5 MIN PRN
Status: DISCONTINUED | OUTPATIENT
Start: 2022-10-26 | End: 2022-10-26 | Stop reason: HOSPADM

## 2022-10-26 RX ORDER — POLYETHYLENE GLYCOL 3350 17 G/17G
17 POWDER, FOR SOLUTION ORAL DAILY
Qty: 510 G | Refills: 0 | Status: SHIPPED | OUTPATIENT
Start: 2022-10-26 | End: 2023-03-29

## 2022-10-26 RX ORDER — ACETAMINOPHEN 325 MG/1
975 TABLET ORAL ONCE
Status: COMPLETED | OUTPATIENT
Start: 2022-10-26 | End: 2022-10-26

## 2022-10-26 RX ORDER — ONDANSETRON HYDROCHLORIDE 4 MG/5ML
4 SOLUTION ORAL 2 TIMES DAILY PRN
Qty: 50 ML | Refills: 0 | Status: SHIPPED | OUTPATIENT
Start: 2022-10-26 | End: 2023-03-29

## 2022-10-26 RX ORDER — HYDROMORPHONE HYDROCHLORIDE 1 MG/ML
0.2 INJECTION, SOLUTION INTRAMUSCULAR; INTRAVENOUS; SUBCUTANEOUS EVERY 5 MIN PRN
Status: DISCONTINUED | OUTPATIENT
Start: 2022-10-26 | End: 2022-10-26 | Stop reason: HOSPADM

## 2022-10-26 RX ORDER — AMOXICILLIN AND CLAVULANATE POTASSIUM 400; 57 MG/5ML; MG/5ML
875 POWDER, FOR SUSPENSION ORAL 2 TIMES DAILY
Qty: 109 ML | Refills: 0 | Status: SHIPPED | OUTPATIENT
Start: 2022-10-26 | End: 2022-10-31

## 2022-10-26 RX ORDER — CEFAZOLIN SODIUM/WATER 2 G/20 ML
2 SYRINGE (ML) INTRAVENOUS SEE ADMIN INSTRUCTIONS
Status: DISCONTINUED | OUTPATIENT
Start: 2022-10-26 | End: 2022-10-26 | Stop reason: HOSPADM

## 2022-10-26 RX ORDER — OXYCODONE HCL 5 MG/5 ML
5 SOLUTION, ORAL ORAL EVERY 6 HOURS PRN
Qty: 120 ML | Refills: 0 | Status: SHIPPED | OUTPATIENT
Start: 2022-10-26 | End: 2022-10-29

## 2022-10-26 RX ORDER — CHLORHEXIDINE GLUCONATE ORAL RINSE 1.2 MG/ML
10 SOLUTION DENTAL ONCE
Status: DISCONTINUED | OUTPATIENT
Start: 2022-10-26 | End: 2022-10-26 | Stop reason: HOSPADM

## 2022-10-26 RX ORDER — PSEUDOEPHEDRINE HCL 60 MG
60 TABLET ORAL EVERY 4 HOURS PRN
Qty: 14 TABLET | Refills: 0 | Status: SHIPPED | OUTPATIENT
Start: 2022-10-26 | End: 2023-03-29

## 2022-10-26 RX ORDER — CHLORHEXIDINE GLUCONATE ORAL RINSE 1.2 MG/ML
15 SOLUTION DENTAL 2 TIMES DAILY
Qty: 500 ML | Refills: 0 | Status: SHIPPED | OUTPATIENT
Start: 2022-10-26 | End: 2023-03-29

## 2022-10-26 RX ORDER — DEXAMETHASONE SODIUM PHOSPHATE 4 MG/ML
INJECTION, SOLUTION INTRA-ARTICULAR; INTRALESIONAL; INTRAMUSCULAR; INTRAVENOUS; SOFT TISSUE PRN
Status: DISCONTINUED | OUTPATIENT
Start: 2022-10-26 | End: 2022-10-26

## 2022-10-26 RX ORDER — CHLORHEXIDINE GLUCONATE ORAL RINSE 1.2 MG/ML
SOLUTION DENTAL PRN
Status: DISCONTINUED | OUTPATIENT
Start: 2022-10-26 | End: 2022-10-26 | Stop reason: HOSPADM

## 2022-10-26 RX ORDER — SODIUM CHLORIDE, SODIUM LACTATE, POTASSIUM CHLORIDE, CALCIUM CHLORIDE 600; 310; 30; 20 MG/100ML; MG/100ML; MG/100ML; MG/100ML
INJECTION, SOLUTION INTRAVENOUS CONTINUOUS PRN
Status: DISCONTINUED | OUTPATIENT
Start: 2022-10-26 | End: 2022-10-26

## 2022-10-26 RX ORDER — ACETAMINOPHEN 160 MG/5ML
500 LIQUID ORAL EVERY 6 HOURS PRN
Qty: 1000 ML | Refills: 0 | Status: SHIPPED | OUTPATIENT
Start: 2022-10-26 | End: 2023-03-29

## 2022-10-26 RX ORDER — BENZOCAINE/MENTHOL 6 MG-10 MG
LOZENGE MUCOUS MEMBRANE 2 TIMES DAILY
Qty: 30 G | Refills: 0 | Status: SHIPPED | OUTPATIENT
Start: 2022-10-26 | End: 2023-03-29

## 2022-10-26 RX ADMIN — LIDOCAINE HYDROCHLORIDE 100 MG: 20 INJECTION, SOLUTION INFILTRATION; PERINEURAL at 18:44

## 2022-10-26 RX ADMIN — Medication 10 MG: at 20:28

## 2022-10-26 RX ADMIN — SODIUM CHLORIDE, POTASSIUM CHLORIDE, SODIUM LACTATE AND CALCIUM CHLORIDE: 600; 310; 30; 20 INJECTION, SOLUTION INTRAVENOUS at 18:35

## 2022-10-26 RX ADMIN — DEXAMETHASONE SODIUM PHOSPHATE 8 MG: 4 INJECTION, SOLUTION INTRA-ARTICULAR; INTRALESIONAL; INTRAMUSCULAR; INTRAVENOUS; SOFT TISSUE at 18:59

## 2022-10-26 RX ADMIN — Medication 30 MG: at 20:54

## 2022-10-26 RX ADMIN — FENTANYL CITRATE 100 MCG: 50 INJECTION, SOLUTION INTRAMUSCULAR; INTRAVENOUS at 18:44

## 2022-10-26 RX ADMIN — Medication 2 G: at 18:48

## 2022-10-26 RX ADMIN — ONDANSETRON 4 MG: 2 INJECTION INTRAMUSCULAR; INTRAVENOUS at 21:08

## 2022-10-26 RX ADMIN — FENTANYL CITRATE 50 MCG: 50 INJECTION, SOLUTION INTRAMUSCULAR; INTRAVENOUS at 19:43

## 2022-10-26 RX ADMIN — SUGAMMADEX 200 MG: 100 INJECTION, SOLUTION INTRAVENOUS at 21:30

## 2022-10-26 RX ADMIN — ACETAMINOPHEN 975 MG: 325 TABLET, FILM COATED ORAL at 16:18

## 2022-10-26 RX ADMIN — DEXMEDETOMIDINE HYDROCHLORIDE 8 MCG: 100 INJECTION, SOLUTION INTRAVENOUS at 20:39

## 2022-10-26 RX ADMIN — Medication 30 MG: at 18:44

## 2022-10-26 RX ADMIN — PHENYLEPHRINE HYDROCHLORIDE 50 MCG: 10 INJECTION INTRAVENOUS at 21:18

## 2022-10-26 RX ADMIN — PROPOFOL 140 MG: 10 INJECTION, EMULSION INTRAVENOUS at 18:44

## 2022-10-26 RX ADMIN — HYDROMORPHONE HYDROCHLORIDE 0.5 MG: 1 INJECTION, SOLUTION INTRAMUSCULAR; INTRAVENOUS; SUBCUTANEOUS at 20:18

## 2022-10-26 RX ADMIN — Medication 10 MG: at 19:15

## 2022-10-26 RX ADMIN — FENTANYL CITRATE 50 MCG: 50 INJECTION, SOLUTION INTRAMUSCULAR; INTRAVENOUS at 19:21

## 2022-10-26 ASSESSMENT — ACTIVITIES OF DAILY LIVING (ADL)
ADLS_ACUITY_SCORE: 35

## 2022-10-26 ASSESSMENT — COPD QUESTIONNAIRES: COPD: 0

## 2022-10-26 ASSESSMENT — LIFESTYLE VARIABLES: TOBACCO_USE: 0

## 2022-10-26 NOTE — DISCHARGE INSTRUCTIONS
POSTSURGICAL INSTRUCTION    VISITORS   It has been our experience that the evening after surgery was a time when visitors are best kept to a minimum. It is encouraged that you have very few visitors and they be limited to the immediate members of the family and/or very close friends.     WOUNDS  The wounds in your mouth and skin should be left alone and undisturbed for the first 24 hours after surgery. You may begin rinsing your mouth on the second day after surgery. A prescription mouth rinse should be used and will be prescribed by your doctor.  Avoid probing the wound with anything and do not use a water-pick during your healing course. Please begin gentle brushing the day after surgery if possible. Keeping the oral cavity and teeth clean will help reduce the risk of infection. Trauma to incisions can result loosening of sutures and opening of wounds. If you smoke please refrain for as long as possible, up to a week (or forever) is beneficial to healing.    NAUSEA AND VOMITING   You may experience some nausea or vomiting after surgery.  It is important to realize that this is not a life threatening situation since your stomach is empty.  If you have had some liquids, remember anything hat went in through the teeth can come out through the teeth if you are wired shut.    If vomiting does occur; (1) remain calm; (2) call the nurse so he/she may assist you; (3) turn on your side or sit up and lean forward so that any fluid produced can be emptied from your mouth. Most patients will not be wired shut but there are some exceptions. You will also be taught how to use a suction device to clear your mouth.  This will be available to you in both the recovery room and in your room when you return to it after surgery. The nurses who care for you are used to dealing with patients who have their jaws with elastics holding them together.  Scissors will be available to these nurses on your reyes, even though it is very unusual  to have to cut the elastics that hold your jaws in position.    SWELLING  Swelling occurs after all surgery.  The degree of swelling is quite variable in different individuals.  More swelling usually occurs with the lower jaw surgery.  Swelling will continue to increase for approximately 48 to 72 hours following surgery, and then will resolve within 10 days to 2 weeks. We try to minimize your swelling with a medication but some swelling should be anticipated.  Also, ice packs may be applied to your face for 24-48 hours. You can reduce swelling by keeping your head elevated for the first week following surgery and by walking as soon as possible after surgery.  The ointment at the bedside should be used to keep your lips moist.    FOLLOWING SURGERY    BLEEDING/NUMBNESS  It is common to experience minor bleeding both from the nose and mouth following surgery.  This generally stops at 24 to 48 hours but may continue for 7-10 days after surgery. Both your upper and lower lips will be numb from the surgery and may stay that way for a few months. Most patients recover all sensation to these areas but about 10-20% of patients will have some permanent lip, chin and teeth numbness following surgery.     NASAL STUFFINESS AND SORE THROAT  Nasal stuffiness and a sore throat can occur, both from the tubes placed during surgery and from surgery on the upper jaw.  When stuffiness occurs, it can be managed with a combination of cleansing of the nostrils and nasal sprays.  Nasal secretions can be removed by using cotton-tipped swabs soaked in a solution of hydrogen peroxide and water (one to three parts).    When it is necessary to use a decongestant nasal spray, squeeze the spray bottle with sufficient force for you to taste the medication.  This will provide relief in approximately 3 to 5 minutes, but nasal decongestant sprays should not be used for more than 4 days.  The nasal stuffiness will resolve within approximately 1-2 weeks  "following surgery.  Your sore throat should improve by the third or fourth day following surgery. If your throat is still bothering you more than a week after surgery please make us aware of this.     HYDRATION  It will be important that you drink a sufficient volume of fluids to keep yourself hydrated  An average adult requires 2 to 3 quarts of fluid every 24 hours.  While this may seem like a large quantity, it can be best achieved with constant sipping.  Most patients drink directly from a cup or glass using a straw.  However, for those who find this difficult a large catheter tipped syringe will be available to assist you in taking fluids.    ELASTICS AFTER SURGERY  In select cases the jaws and teeth do not require elastics after surgery.  This is determined by the specific nature of your skeletal condition and will be decided by your surgeon.  When the teeth have elastics holding the jaws together, these will be in place for 1 to 3 weeks.  In some instances the jaws will have to be in elastics for a longer period of time.    WALKING  You are encouraged to begin to walk as soon as possible, even if bone has been taken from the hip for use during jaw surgery.    BITE GUIDES  In many cases a clear plastic splint is placed between your teeth at surgery.  A splint is a plastic (acrylic) device constructed from your dental models that have been placed into the new bite (occlusal relationship).  After the jaw surgery, the teeth are often wired together into the \"splint\" to establish and maintain the correct jaw position.  This will remain in place until the wires are removed, and in most instances, will be used for a time following the release of fixation, typically 3 weeks.    SPEECH  The ease with which you can communicate and be understood is not predictable immediately after surgery.  Your speech will improve, however, by repeated attempts on your part to talk and be understood.  It is important that you slow your " "rate of speech, concentrate on each word, and be willing to try.  Most patients can be understood within 24 hours after surgery but speech may take time to adapt to new jaw position.    CLEANING THE TEETH   You will be encouraged to brush your teeth following each meal.  A child-sized soft toothbrush can be utilized for this purpose, paying particular attention to keeping the brush in direct contact with the teeth.  In addition to brushing, a mouth rinse may be used.  The rinse is made by diluting hydrogen peroxide, 50:50, with any commercial mouth rinse.  Do not use a water irrigating device, such as \"water pic,\" until after 2 weeks following surgery.  These irrigating devices have sufficient force to tear open the sutures in your mouth.  A toothbrush will, however, provide an excellent means of maintaining oral hygiene approximately 2 weeks following surgery.    POSTOPERATIVE PAIN  Pain must be expected. In most instances, however, it is moderate and treated with medications. Every patient experiences pain differently and there is no one size fits all approach. Your surgical team will attempt to tailor your medications to best control your pain. When bone grafts are taken from the hip (or rib or skull) more discomfort should be expected.  Generally, no injections are needed for pain and a liquid medication is all that is required.    MEDICATIONS  During the period of hospitalizations, you will usually be given antibiotics, nasal decongestants, nasal spray, ointment to keep your lips moist, and a pain medication if needed.  Most often these will be discontinued on discharge from the hospital, except for some of the medications, which will be used for 3 days to 2 weeks following surgery. Antibiotics are typically not prescribed after your hospital stay as you have getting them through the IV. An additional course of oral antibiotics may be prescribed but only if deemed necessary by the surgical team. The typical " "regiment of medications include pain medication, stool softener, nasal decongestants, mucolytics and anti-nausea medications. If you feel that you need additional medications please inform your surgical team. Below is a list of medications and the common usages    COMMON MEDICATIONS:  Hydrocodone/Oxycodone - Narcotic pain control  Acetominophen/Ibuprofen - Non-narcotic pain control  Amoxicillin/Clindamycin - Antibiotic, typically a 5-7 day course  Ondansetron/Phenergan/Compazine/Scopolamine - Anti-nausea medications  Guaufenesin - Mucolytic, used to keep mucous from hardening in nasal passages after upper jaw surgery  Sudafed/Claritin - Decongestant, used to keep sinuses clear after upper jaw surgery  Colace/Senna - Stool softener used while taking Narcotic pain medication    Chlorhexidine Rinse - An antiseptic mouth rinse that help reduce the bacterial load in the oral cavity    DIET  During the period of hospitalization we may ask a dietician will discuss \"at-home\" dietary management with you and members of your family.  This will include a diet booklet.  It is suggested that prior to your surgery, you acquire a  and a food strainer.  Commercial dietary supplements such as Ensure   may also be of assistance during the postoperative period; the dietician can suggest those that are recommended.  These can be purchased, without a prescription, in your local pharmacy.  Most have a variety of flavors.     WEIGHT LOSS  Again, a weight loss of 10 to 20 pounds may be anticipated during the postoperative period.  This is usually a reflection of a loss of appetite, rather than the fact that the teeth are \"wired\" together.  Within 2 weeks following surgery, your appetite should be sufficiently improved to maintain and possibly increase your weight.    DAY OF DISCHARGE  Most patients are ready for discharge 0 to 2 days after surgery.  You will be encouraged to resume your normal activities as soon as possible. Your " discharge from the hospital will be dictated by four things. One, your ability to walk independently. Two, your ability to maintain adequate fluid intake to prevent dehydration. Three, your ability and urinate without difficulty and four, that your pain is controlled with medications taken by mouth. After your discharge you will be provided the contact information of the on-call Oral surgery resident. This person is available for questions should any arise after discharge but please read through this entire document prior to calling. The answer may be in the document.      IN SUMMARY:   1. Keep your mouth clean by gently brushing and rinsing often  2. Keep up some daily activity such as walking  3. Pain in normal, stay ahead of your pain by taking all medications as prescribed  4. Swelling is normal, use ice for 3 days following surgery then switch to warm compresses  6. Bleeding from the incisions and nose is normal and can be expected for 7-10 days after surgery  7. You can use straws or spoons  8. Bruising, even to the chest is normal but does not happen in every case  9. Keep hydrated and consume as many calories as you can. This will speed your recovery.   10. If the elastics on your teeth break or fall off. It is OK to go without for several days or until you next follow up appointment    THINGS TO WATCH OUT FOR:  Any new onset of swelling or redness after 5 days  Any increase in pain after 5 days, your pain should reduce, even if just a little, each day after surgery  Any changes in the way the teeth fit together or how they fit into the splint     In you have any questions please feel free to call our clinic or page the oral surgery resident on call. You may also e-mail me at any time.     [phone] - Ask for the Oral Surgery Resident on Call    Dr. Saunders's e-mail/cell phone for emergencies  [phone] cell  [phone] - pager  [email or web address]

## 2022-10-26 NOTE — ANESTHESIA PREPROCEDURE EVALUATION
Anesthesia Pre-Procedure Evaluation    Patient: Sudeep Aguayo   MRN: [id number] : 2000        Procedure : Procedure(s):  PLACEMENT OF MAXILLARY DISTRACTORS          Past Medical History:   Diagnosis Date     Choanal atresia      Choanal atresia      Choanal atresia      Chronic maxillary sinusitis      Chronic maxillary sinusitis      Chronic maxillary sinusitis      Chronic pulmonary aspiration      Chronic pulmonary aspiration      Chronic pulmonary aspiration      Chronic serous otitis media of both ears      Chronic serous otitis media of both ears      Chronic serous otitis media of both ears      Closed fracture of right distal fibula 2004    due to bone cyst     Closed fracture of right distal fibula 2004    due to bone cyst     Closed fracture of right distal fibula 2004    due to bone cyst     Cryptorchidism      Cryptorchidism      Cryptorchidism      Disorder of stomach function and feeding problems in      MATY (gastro esoph reflux)     DU (perforated duodenal ulcer) (H) 2002     DU (perforated duodenal ulcer) (H) 2002     DU (perforated duodenal ulcer) (H) 2002     Echocardiogram abnormal 2003    small PFO     Echocardiogram abnormal 2003    small PFO     Echocardiogram abnormal 2003    small PFO     Esophageal reflux      Esophageal reflux      Esophageal reflux      Failure to thrive in childhood      FTT (failure to thrive) in child      FTT (failure to thrive) in child      FTT (failure to thrive) in child      Hypotonia      Hypotonia      Hypotonia      Lack of expected normal physiological development in childhood     Develop. delay     Lacrimal duct stenosis, bilateral      Lacrimal duct stenosis, bilateral      Lacrimal duct stenosis, bilateral      Positional plagiocephaly     had helmet from 5-8 months of age     Positional plagiocephaly     had helmet from 5-8 months of age     Positional plagiocephaly     had helmet from 5-8 months of age     S/P  percutaneous endoscopic gastrostomy (PEG) tube placement (H)      S/P percutaneous endoscopic gastrostomy (PEG) tube placement (H)      S/P percutaneous endoscopic gastrostomy (PEG) tube placement (H)      Speech delay      Speech delay      Speech delay      Torticollis      Torticollis      Torticollis      Trachea displaced     right arytenoid     Trachea displaced     right arytenoid     Trachea displaced     right arytenoid     Undescended testis      Unspecified disorder of thyroid     Thyroid disease     Unspecified otitis media 2001,2002,2003,2004,2005,    Recurrent otitis     Varicella 09/15/2006     Varicella 09/15/2006     Varicella 09/15/2006     Viral warts, unspecified      Viral warts, unspecified      Viral warts, unspecified       Past Surgical History:   Procedure Laterality Date     BIOPSY SKIN (LOCATION)       BRONCHOSCOPY FLEXIBLE AND RIGID  03/2003     ORCHIOPEXY Bilateral 11/01/2002     OTHER SURGICAL HISTORY  12/2002    Duodenal Ulcer Perforation Repair     PE TUBES       PEG TUBE PLACEMENT  12/2002     REPAIR CHOANAL ATRESIA Bilateral 2001     TONSILLECTOMY & ADENOIDECTOMY       TONSILLECTOMY, ADENOIDECTOMY, MYRINGOTOMY, INSERT TUBE BILATERAL, COMBINED Bilateral 2002    at [facility] REPAIR PERF DUOD/PUJA ULC-WND/INJ        Allergies   Allergen Reactions     No Known Drug Allergies       Social History     Tobacco Use     Smoking status: Never     Smokeless tobacco: Never     Tobacco comments:     no vaping   Substance Use Topics     Alcohol use: Never      Wt Readings from Last 1 Encounters:   10/13/22 59.9 kg (132 lb)        Anesthesia Evaluation   Pt has had prior anesthetic. Type: General.    No history of anesthetic complications       ROS/MED HX  ENT/Pulmonary:    (-) tobacco use, asthma and COPD   Neurologic:     (+) Developmental delay,     Cardiovascular: Comment: Small PFO   (-) hypertension   METS/Exercise Tolerance:     Hematologic:       Musculoskeletal: Comment:  [place] Syndrome w/ hypotonia   (-) arthritis   GI/Hepatic:     (+) GERD,     Renal/Genitourinary:       Endo:     (+) thyroid problem, hypothyroidism,     Psychiatric/Substance Use:     (+) psychiatric history anxiety (ADHD)     Infectious Disease:  - neg infectious disease ROS  (-) Recent Fever   Malignancy:  - neg malignancy ROS     Other:  - neg other ROS          Physical Exam    Airway        Mallampati: II   TM distance: > 3 FB   Neck ROM: full   Mouth opening: < 3 cm    Respiratory Devices and Support         Dental  no notable dental history         Cardiovascular          Rhythm and rate: regular and normal     Pulmonary   pulmonary exam normal        breath sounds clear to auscultation       Other findings: Per ENT Note: Class III skeletal-dental deformity with class III canines and molars. Overjet -9 mm, overbite 5 mm. Maxillary tooth crowding. Poor oral hygiene with heavy plaque, gingiva erythematous. Vestibules and floor of mouth soft, non-tender, non-distended. No intraoral lesions. Tongue and uvula midline, oropharynx clear. DEMAR 40 mm.    OUTSIDE LABS:  CBC:   Lab Results   Component Value Date    HGB 14.9 10/13/2022    HGB 15.6 02/14/2022     BMP:   Lab Results   Component Value Date    GLC 89 02/14/2022     COAGS: No results found for: PTT, INR, FIBR  POC: No results found for: BGM, HCG, HCGS  HEPATIC: No results found for: ALBUMIN, PROTTOTAL, ALT, AST, GGT, ALKPHOS, BILITOTAL, BILIDIRECT, ISREAL  OTHER:   Lab Results   Component Value Date    TSH 2.21 02/14/2022       Anesthesia Plan    ASA Status:  3   NPO Status:  NPO Appropriate    Anesthesia Type: General.     - Airway: ETT   Induction: Intravenous.   Maintenance: Balanced.   Techniques and Equipment:     - Airway: Video-Laryngoscope, Fiberoptic Bronchoscope     - Lines/Monitors: BIS     Consents    Anesthesia Plan(s) and associated risks, benefits, and realistic alternatives discussed. Questions answered and patient/representative(s)  expressed understanding.    - Discussed:     - Discussed with:  Patient      - Patient is DNR/DNI Status: No         Postoperative Care    Pain management: Multi-modal analgesia.   PONV prophylaxis: Ondansetron (or other 5HT-3), Dexamethasone or Solumedrol     Comments:                Arsh Menendez MD

## 2022-10-27 LAB — GLUCOSE BLDC GLUCOMTR-MCNC: 79 MG/DL (ref 70–99)

## 2022-10-27 NOTE — ANESTHESIA POSTPROCEDURE EVALUATION
Patient: Sudeep Aguayo    Procedure: Procedure(s):  PLACEMENT OF MAXILLARY DISTRACTORS       Anesthesia Type:  General    Note:  Disposition: Outpatient   Postop Pain Control: Uneventful            Sign Out: Well controlled pain   PONV: No   Neuro/Psych: Uneventful            Sign Out: Acceptable/Baseline neuro status   Airway/Respiratory: Uneventful            Sign Out: Acceptable/Baseline resp. status   CV/Hemodynamics: Uneventful            Sign Out: Acceptable CV status; No obvious hypovolemia; No obvious fluid overload   Other NRE: NONE   DID A NON-ROUTINE EVENT OCCUR? No           Last vitals:  Vitals Value Taken Time   /59 10/26/22 2245   Temp 37.3  C (99.2  F) 10/26/22 2245   Pulse 96 10/26/22 2245   Resp 14 10/26/22 2245   SpO2 93 % 10/26/22 2248   Vitals shown include unvalidated device data.    Electronically Signed By: Arsh Menendez MD  October 26, 2022  11:04 PM

## 2022-10-27 NOTE — ANESTHESIA PROCEDURE NOTES
Airway       Patient location during procedure: OR       Procedure Start/Stop Times: 10/26/2022 6:46 PM  Staff -        CRNA: Kenya Martell APRN CRNA       Performed By: CRNA  Consent for Airway        Urgency: elective  Indications and Patient Condition       Indications for airway management: aurelia-procedural         Mask difficulty assessment: 1 - vent by mask    Final Airway Details       Final airway type: endotracheal airway       Successful airway: ETT - single, Oral and ZAHIDA  Endotracheal Airway Details        ETT size (mm): 7.5       Cuffed: yes       Successful intubation technique: video laryngoscopy       VL Blade Size: Glidescope 3       Grade View of Cords: 1       Adjucts: stylet       Position: Center       Bite block used: None    Post intubation assessment        Number of attempts at approach: 1       Number of other approaches attempted: 0       Secured with: silk tape       Ease of procedure: easy       Dentition: Intact and Unchanged    Medication(s) Administered   Medication Administration Time: 10/26/2022 6:46 PM

## 2022-10-27 NOTE — ANESTHESIA CARE TRANSFER NOTE
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    Anesthesia Type:   General     Note:    Oropharynx: spontaneously breathing  Level of Consciousness: awake  Oxygen Supplementation: face mask  Level of Supplemental Oxygen (L/min / FiO2): 6  Independent Airway: airway patency satisfactory and stable  Dentition: dentition unchanged  Vital Signs Stable: post-procedure vital signs reviewed and stable  Report to RN Given: handoff report given  Patient transferred to: PACU    Handoff Report: Identifed the Patient, Identified the Reponsible Provider, Reviewed the pertinent medical history, Discussed the surgical course, Reviewed Intra-OP anesthesia mangement and issues during anesthesia, Set expectations for post-procedure period and Allowed opportunity for questions and acknowledgement of understanding      Vitals:  Vitals Value Taken Time   /57    Temp     Pulse 88    Resp 67    SpO2 100        Electronically Signed By: Cecilia Appiah MD  October 26, 2022  10:07 PM

## 2022-10-27 NOTE — BRIEF OP NOTE
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  Complications: None.  Implants:   Implant Name Type Inv. Item Serial No.  Lot No. LRB No. Used Action   IMP SCR SYN MATRIX [id number] SELF DRILL [id number] - SN/A Metallic [employer]ware/Irvona IMP SCR SYN MATRIX 1.85X6MM SELF DRILL [id number] N/A [employer] N/A N/A 2 Implanted   SCREW BN 6MM 2MM SS ST NS MXL WICHO - SN/A Metallic Hardware/Irvona SCREW BN 6MM 2MM SS ST NS MXL WICHO N/A [employer]- N/A N/A 12 Implanted   SCREW BN 8MM 2.4MM SS ST NS MXL WICHO - SN/A Metallic Hardware/[employer]vona SCREW BN 8MM 2.4MM SS ST NS MXL WICHO N/A [employer]- N/A N/A 2 Implanted   Maxillary Distractor Bodies 15mm    N/A  N/A N/A 2 Implanted          1 Implanted       Carlene Mariee DDS  Oral & Maxillofacial Surgery PGY-4

## 2022-10-31 NOTE — OP NOTE
OPERATIVE REPORT    Sudeep Aguayo   : 2000   SEX: male   MRN: [id number]    DATE OF SERVICE: 10/26/2022    STAFF SURGEON: Parviz Saunders DDS, MD    RESIDENT SURGEON: Carlene Mariee DDS    :   1. Mike Jerry DDS  2. Karla Chandler DDS    PREOPERATIVE DIAGNOSIS:   1. Severe class III skeletal-dental deformity  2. Maxillary hypoplasia  3. Masticatory insufficiency  4. Speech disorder    POSTOPERATIVE DIAGNOSIS:  1. Severe class III skeletal-dental deformity  2. Maxillary hypoplasia  3. Masticatory insufficiency  4. Speech disorder    PROCEDURES PERFORMED: Bilateral maxillary distractor placement     ANESTHESIA: General via orotracheal intubation    INDICATIONS FOR OPERATION: Sudeep Aguayo is a  22 year old male with a past medical history significant for [place] syndrome, hypothyroidism, GERD, developmental delay, ADHD, and anxiety, who presents with severe class III skeletal-dental deformity and maxillary hypoplasia causing malocclusion, masticatory insufficiency, and difficulties with speech. His overjet is -9 mm, which cannot be treated with orthognathic surgery alone. The patient reports he has difficulty biting and chewing his food, and also has some difficulty with swallowing. He has seen a speech and language pathologist, who also recommends jaw surgery to help with the patient's difficulty with swallowing and speech. Indication for maxillary distraction osteogenesis.     DESCRIPTION OF PROCEDURE  The patient was met in the preoperative holding area and informed consent was obtained. The surgical site was marked on the diagram and the patient was taken to the operating room. He was transferred to the operating room table and underwent a smooth induction of general anesthesia. A nasotracheal tube was passed on the first attempt. The tube was secured by the oral surgery service. A moist throat pack was placed and 6 ml of 0.25% bupivacaine with 1:200,000 epinephrine was  administered intraorally. The patient was prepped and draped in standard fashion. A time-out was performed in accordance with [facility] policy.      Attention was turned to the maxilla. Using the needle tip Bovie electrocautery, an incision was made along the maxillary buccal vestibule 5 mm above the attached mucogingival junction extending to bilateral premolar region. A full thickness mucoperiosteal flap was reflected using a #9 mucoperiosteal elevator. The soft tissue was elevated in a subperiosteal plane to expose the zygomatic buttresses laterally, the infraorbital nerve superiorly, the nasal aperture medially, and pterygoid plates posteriorly. After this was performed, a [place] elevator and nasal freer were used to elevate the nasal mucosa from the nasal floor. Continued dissection of the nasal mucosa was performed from the piriform, medial/lateral nasal walls, and the nasal spine. Following adequate exposure, pre-bent bilateral maxillary distractors were adapted to the maxilla. All pre-planned screw holes were then drilled under copious irrigation. The distractors were then removed. A custom fabricated titanium cut guide was secured to the maxilla with a total of two 6 mm screws. A reciprocating saw was then used under copious irrigation to perform bilateral horizontal osteotomies extending from the pterygoid plates posteriorly to the lateral piriform rim anteriorly according to the custom fabricated titanium cut guide. The titanium cut guide and two 6 mm fixation screws were then removed and the bilateral horizontal osteotomies were completed under copious irrigation. A curved pterygoid osteotome was then used to separate the pterygoid plates from the maxilla bilaterally. A uni-ball osteotome was used to propagate the osteotomies along the lateral nasal walls posteriorly. A double-ball osteotome was used to propagate an osteotomy along the inferior most aspect of the nasal  septum posteriorly. Prior to down fracture of the maxilla, adequate hypotensive anesthesia was verified with a MAP of approximately 65 mmHg. Digital manipulation was used to down fracture the maxilla. Rivera disimpaction forceps were used to complete maxillary down fracture and to adequately mobilize the maxilla. Bilateral bony interferences were then reduced with a rongeur and surgical handpiece with barrel bur under copious irrigation. The descending palatine arteries were visualized torn bilaterally and were cauterized. The pre-bent bilateral maxillary distractors were then placed. All previously drilled fixation screw holes were verified to line up with the maxillary distractors. Maxillary distractors were then fixated to the bilateral maxilla, resulting in stable fixation. Copious irrigation was performed of the maxillary surgical sites bilaterally. A 2-0 PDS suture was used to perform an alar cinch suture. Maxillary incisions were then closed with 3-0 chromic gut in a running fashion.       The oral cavity was irrigated with sterile saline and the throat pack was removed with suction. The oropharynx was found to be clear. An orogastric tube was passed to decompress the contents of the stomach. At the completion of the procedure, all counts were found to be correct. The patient was then turned over to the anesthesia service for emergence. He was extubated in the OR and taken to the PACU in good condition. The attending surgeon, Dr. Parviz Saunders, was present for the entire procedure/key portions of the procedure.     ESTIMATED BLOOD LOSS: 50 mL  FLUIDS: See anesthesia report  DRAINS: None  COMPLICATIONS: None  SPECIMENS: None  DISPOSITION: Transported to PACU in stable condition prior to being discharged home     Carlene Mariee DDS  Oral & Maxillofacial Surgery PGY-4

## 2023-03-08 DIAGNOSIS — Q18.8 FACIAL DYSMORPHIA: Primary | ICD-10-CM

## 2023-03-21 ENCOUNTER — TELEPHONE (OUTPATIENT)
Dept: PEDIATRICS | Facility: CLINIC | Age: 23
End: 2023-03-21
Payer: COMMERCIAL

## 2023-03-29 ENCOUNTER — OFFICE VISIT (OUTPATIENT)
Dept: FAMILY MEDICINE | Facility: CLINIC | Age: 23
End: 2023-03-29
Payer: COMMERCIAL

## 2023-03-29 VITALS
HEIGHT: 64 IN | WEIGHT: 111.1 LBS | HEART RATE: 101 BPM | OXYGEN SATURATION: 96 % | DIASTOLIC BLOOD PRESSURE: 60 MMHG | TEMPERATURE: 98.3 F | SYSTOLIC BLOOD PRESSURE: 102 MMHG | BODY MASS INDEX: 18.97 KG/M2

## 2023-03-29 DIAGNOSIS — R01.1 HEART MURMUR: ICD-10-CM

## 2023-03-29 DIAGNOSIS — H61.23 BILATERAL IMPACTED CERUMEN: ICD-10-CM

## 2023-03-29 DIAGNOSIS — Z01.818 PREOP GENERAL PHYSICAL EXAM: Primary | ICD-10-CM

## 2023-03-29 DIAGNOSIS — E83.52 HYPERCALCEMIA: ICD-10-CM

## 2023-03-29 DIAGNOSIS — F89 NEURODEVELOPMENTAL DISORDER: ICD-10-CM

## 2023-03-29 LAB
ANION GAP SERPL CALCULATED.3IONS-SCNC: 13 MMOL/L (ref 7–15)
BUN SERPL-MCNC: 11.8 MG/DL (ref 6–20)
CALCIUM SERPL-MCNC: 10.5 MG/DL (ref 8.6–10)
CHLORIDE SERPL-SCNC: 101 MMOL/L (ref 98–107)
CREAT SERPL-MCNC: 0.74 MG/DL (ref 0.67–1.17)
DEPRECATED HCO3 PLAS-SCNC: 28 MMOL/L (ref 22–29)
ERYTHROCYTE [DISTWIDTH] IN BLOOD BY AUTOMATED COUNT: 12.7 % (ref 10–15)
GFR SERPL CREATININE-BSD FRML MDRD: >90 ML/MIN/1.73M2
GLUCOSE SERPL-MCNC: 82 MG/DL (ref 70–99)
HCT VFR BLD AUTO: 45.1 % (ref 40–53)
HGB BLD-MCNC: 15.9 G/DL (ref 13.3–17.7)
MCH RBC QN AUTO: 28.8 PG (ref 26.5–33)
MCHC RBC AUTO-ENTMCNC: 35.3 G/DL (ref 31.5–36.5)
MCV RBC AUTO: 82 FL (ref 78–100)
PLATELET # BLD AUTO: 310 10E3/UL (ref 150–450)
POTASSIUM SERPL-SCNC: 4.4 MMOL/L (ref 3.4–5.3)
RBC # BLD AUTO: 5.53 10E6/UL (ref 4.4–5.9)
SODIUM SERPL-SCNC: 142 MMOL/L (ref 136–145)
WBC # BLD AUTO: 7.9 10E3/UL (ref 4–11)

## 2023-03-29 PROCEDURE — 85027 COMPLETE CBC AUTOMATED: CPT

## 2023-03-29 PROCEDURE — 99214 OFFICE O/P EST MOD 30 MIN: CPT

## 2023-03-29 PROCEDURE — 36415 COLL VENOUS BLD VENIPUNCTURE: CPT

## 2023-03-29 PROCEDURE — 80048 BASIC METABOLIC PNL TOTAL CA: CPT

## 2023-03-29 NOTE — PROGRESS NOTES
[facility]  [address]  [facility] [address]  Phone: [phone]  Fax: [phone]  Primary Provider: Sunny - PHILOMENA [facility]  Pre-op Performing Provider: ZAIDA BARNETT      PREOPERATIVE EVALUATION:  Today's date: 3/29/2023    Sudeep Aguayo is a 22 year old male who presents for a preoperative evaluation.  Additional Questions 3/29/2023   Roomed by Jaki   Accompanied by Mother     Surgical Information:  Surgery/Procedure: Dental extraction   Surgery Location: [facility]   Surgeon: Dr. Saunders   Surgery Date: 4.4.2023  Time of Surgery: 12p   Where patient plans to recover: At home with family  Fax number for surgical facility: Note does not need to be faxed, will be available electronically in Epic.    Assessment & Plan     The proposed surgical procedure is considered LOW risk.    Preop general physical exam  Patient appears well on exam. Today, there is a very faint 1/6 murmur appreciated on exam. Asymptomatic, mom declines further work-up. No other acute findings or reason to prevent surgery until further testing completed.   - CBC with platelets; Future  - Basic metabolic panel  (Ca, Cl, CO2, Creat, Gluc, K, Na, BUN); Future    Bilateral impacted cerumen  Noted during physical exam. Lavage removal by MA staff. Patient tolerated well.   - NE REMOVAL IMPACTED CERUMEN IRRIGATION/LVG UNILAT    Heart murmur  Barely audible, grade 1/6 murmur heard best at the left sternal boarder. Asymptomatic. Mom declines any further work-up. If patient develops and SOB, chest pain, peripheral edema, orthopnea, or dizziness prior to the procedure would want further work-up done. Mom verbalizes understanding.     Hypercalcemia  Identified on work-up today. 10.5. Will offer recheck in 2-3 weeks to mom. No need to hold off on procedure.   - Calcium; Future    Neurodevelopmental disorder  Chronic, managed well with parents support. He appears at baseline today. He  understands why he is having surgery and what is being done.     Risks and Recommendations:  The patient has the following additional risks and recommendations for perioperative complications:   - No identified additional risk factors other than previously addressed    Medication Instructions:  Patient is on no chronic medications    RECOMMENDATION:  APPROVAL GIVEN to proceed with proposed procedure, without further diagnostic evaluation.    Subjective     HPI related to upcoming procedure: He had a jaw surgery for an under bite and he is having the hardware taken back out.     Preop Questions 3/29/2023   1. Have you ever had a heart attack or stroke? No   2. Have you ever had surgery on your heart or blood vessels, such as a stent placement, a coronary artery bypass, or surgery on an artery in your head, neck, heart, or legs? No   3. Do you have chest pain with activity? No   4. Do you have a history of  heart failure? No   5. Do you currently have a cold, bronchitis or symptoms of other infection? No   6. Do you have a cough, shortness of breath, or wheezing? No   7. Do you or anyone in your family have previous history of blood clots? No   8. Do you or does anyone in your family have a serious bleeding problem such as prolonged bleeding following surgeries or cuts? No   9. Have you ever had problems with anemia or been told to take iron pills? No   10. Have you had any abnormal blood loss such as black, tarry or bloody stools? No   11. Have you ever had a blood transfusion? UNKNOWN    12. Are you willing to have a blood transfusion if it is medically needed before, during, or after your surgery? Yes   13. Have you or any of your relatives ever had problems with anesthesia? No   14. Do you have sleep apnea, excessive snoring or daytime drowsiness? No   15. Do you have any artifical heart valves or other implanted medical devices like a pacemaker, defibrillator, or continuous glucose monitor? No   16. Do you have  artificial joints? No   17. Are you allergic to latex? No     Health Care Directive:  Patient does not have a Health Care Directive or Living Will: Discussed advance care planning with patient; however, patient declined at this time.    Preoperative Review of :   reviewed - no record of controlled substances prescribed.      Review of Systems   Constitutional: Negative for chills, diaphoresis and fatigue.   HENT: Negative for ear pain.    Eyes: Negative for pain.   Respiratory: Negative for cough and shortness of breath.    Cardiovascular: Negative for chest pain, palpitations and peripheral edema.   Gastrointestinal: Negative for abdominal pain, constipation and diarrhea.   Genitourinary: Negative for difficulty urinating.   Musculoskeletal: Negative for arthralgias and myalgias.   Neurological: Negative for dizziness, weakness, numbness, headaches and paresthesias.     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  delayed adaptive skills       Mild intellectual disability 02/14/2022     Priority: Medium     Neurodevelopmental disorder 02/14/2022     Priority: Medium     Short stature disorder 02/14/2022     Priority: Medium     Subclinical hypothyroidism 06/20/2017     Priority: Medium     Formatting of this note might be different from the original.  Pituitary MRI in 2/26/2003 normal. Followed by Dr. Apolinar Borrero at [facility].       Vitamin D deficiency 01/19/2016     Priority: Medium     Anxiety state 11/22/2014     Priority: Medium     Formatting of this note might be different from the original.  Replacement Utility  updated for latest IMO load       Hypothyroidism      Priority: Medium     Hypothyroidism  Problem list name updated by automated process. Provider to review        Past Medical History:   Diagnosis Date     Choanal atresia      Choanal atresia      Choanal atresia      Chronic maxillary sinusitis      Chronic maxillary sinusitis      Chronic maxillary sinusitis      Chronic pulmonary aspiration      Chronic pulmonary aspiration      Chronic pulmonary aspiration      Chronic serous otitis media of both ears      Chronic serous otitis media of both ears      Chronic serous otitis media of both ears      Closed fracture of right distal fibula 2004    due to bone cyst     Closed fracture of right distal fibula 2004    due to bone cyst     Closed fracture of right distal fibula 2004    due to bone cyst     Cryptorchidism      Cryptorchidism      Cryptorchidism      Disorder of stomach function and feeding problems in      MATY (gastro esoph reflux)     DU (perforated duodenal ulcer) (H) 2002     DU (perforated duodenal ulcer) (H) 2002     DU (perforated duodenal ulcer) (H) 2002     Echocardiogram abnormal 2003    small PFO     Echocardiogram abnormal 2003    small PFO     Echocardiogram abnormal 2003    small PFO     Esophageal reflux      Esophageal reflux      Esophageal reflux      Failure to thrive in childhood      FTT (failure to thrive) in child      FTT (failure to thrive) in child      FTT (failure to thrive) in child      Hypotonia      Hypotonia      Hypotonia      Lack of expected normal physiological development in childhood     Develop. delay     Lacrimal duct stenosis, bilateral      Lacrimal duct stenosis, bilateral      Lacrimal duct stenosis, bilateral      Positional plagiocephaly     had helmet from 5-8 months of age     Positional plagiocephaly     had helmet from 5-8 months of age     Positional plagiocephaly     had helmet from 5-8 months of age     S/P  percutaneous endoscopic gastrostomy (PEG) tube placement (H)      S/P percutaneous endoscopic gastrostomy (PEG) tube placement (H)      S/P percutaneous endoscopic gastrostomy (PEG) tube placement (H)      Speech delay      Speech delay      Speech delay      Torticollis      Torticollis      Torticollis      Trachea displaced     right arytenoid     Trachea displaced     right arytenoid     Trachea displaced     right arytenoid     Undescended testis      Unspecified disorder of thyroid     Thyroid disease     Unspecified otitis media 2001,2002,2003,2004,2005,    Recurrent otitis     Varicella 09/15/2006     Varicella 09/15/2006     Varicella 09/15/2006     Viral warts, unspecified      Viral warts, unspecified      Viral warts, unspecified      Past Surgical History:   Procedure Laterality Date     BIOPSY SKIN (LOCATION)       BRONCHOSCOPY FLEXIBLE AND RIGID  03/2003     LE FORT ONE Bilateral 10/26/2022    Procedure: PLACEMENT OF MAXILLARY DISTRACTORS;  Surgeon: Parviz Saunders DDS;  Location: [facility] OR     ORCHIOPEXY Bilateral 11/01/2002     OTHER SURGICAL HISTORY  12/2002    Duodenal Ulcer Perforation Repair     PE TUBES       PEG TUBE PLACEMENT  12/2002     REPAIR CHOANAL ATRESIA Bilateral 2001     TONSILLECTOMY & ADENOIDECTOMY       TONSILLECTOMY, ADENOIDECTOMY, MYRINGOTOMY, INSERT TUBE BILATERAL, COMBINED Bilateral 2002    at [facility] REPAIR PERF DUOD/PUJA U[facility]-WND/INJ       Current Outpatient Medications   Medication Sig Dispense Refill     acetaminophen (TYLENOL) 160 MG/5ML solution Take 15.62 mLs (500 mg) by mouth every 6 hours as needed for fever or mild pain (Patient not taking: Reported on 3/29/2023) 1000 mL 0     chlorhexidine (PERIDEX) 0.12 % solution Swish and spit 15 mLs in mouth 2 times daily (Patient not taking: Reported on 3/29/2023) 500 mL 0     guanFACINE (INTUNIV) 1 MG TB24 24 hr tablet TAKE 1 TABLET BY MOUTH EVERY MORNING 90 tablet 3     hydrocortisone (CORTAID) 1 % external cream Apply  "topically 2 times daily (Patient not taking: Reported on 3/29/2023) 30 g 0     hydrOXYzine (VISTARIL) 25 MG capsule TAKE 1 TO 2 CAPSULES BY MOUTH EVERY 8 HOURS AS NEEDED FOR ANXIETY (Patient not taking: Reported on 3/29/2023)       ondansetron (ZOFRAN) 4 MG/5ML solution Take 5 mLs (4 mg) by mouth 2 times daily as needed for nausea or vomiting (Patient not taking: Reported on 3/29/2023) 50 mL 0     polyethylene glycol (MIRALAX) 17 GM/Dose powder Take 17 g by mouth daily (Patient not taking: Reported on 3/29/2023) 510 g 0     pseudoePHEDrine (SUDAFED) 60 MG tablet Take 1 tablet (60 mg) by mouth every 4 hours as needed for congestion (Patient not taking: Reported on 3/29/2023) 14 tablet 0     risperiDONE (RISPERDAL) 0.5 MG tablet Take 0.5 mg by mouth At Bedtime (Patient not taking: Reported on 3/29/2023)       sodium chloride (OCEAN) 0.65 % nasal spray Spray in each nare PRN as needed for congestion (Patient not taking: Reported on 3/29/2023) 480 mL 0       Allergies   Allergen Reactions     No Known Drug Allergies         Social History     Tobacco Use     Smoking status: Never     Passive exposure: Never     Smokeless tobacco: Never     Tobacco comments:     no vaping   Substance Use Topics     Alcohol use: Never     Family History   Problem Relation Age of Onset     Attention Deficit Disorder Cousin         paternal cousin     Diabetes Type 2  Maternal Grandmother      Cancer Paternal Grandfather      History   Drug Use Unknown         Objective     /60 (BP Location: Right arm, Patient Position: Sitting, Cuff Size: Adult Small)   Pulse 101   Temp 98.3  F (36.8  C) (Oral)   Ht 1.629 m (5' 4.13\")   Wt 50.4 kg (111 lb 1.6 oz)   SpO2 96%   BMI 18.99 kg/m      Physical Exam    GENERAL APPEARANCE: healthy, alert and no distress     EYES: EOMI,  PERRL     HENT: ear canals and TM's normal and nose and mouth without ulcers or lesions     NECK: no adenopathy, no asymmetry, masses, or scars and thyroid normal to " palpation     RESP: lungs clear to auscultation - no rales, rhonchi or wheezes     CV: regular rates and rhythm, normal S1 S2, no S3 or S4 and faint 1/6 murmur heard best at the left sternal boarder, no click or rub     ABDOMEN:  soft, nontender, no HSM or masses and bowel sounds normal     MS: extremities normal- no gross deformities noted, no evidence of inflammation in joints, FROM in all extremities.     SKIN: no suspicious lesions or rashes     NEURO: Normal strength and tone, sensory exam grossly normal, mentation intact and speech normal     PSYCH: at baseline, history of cognitive delay. Interacts appropriately with provider, answers questions appropriately.      LYMPHATICS: No cervical adenopathy    Diagnostics:  Recent Results (from the past 48 hour(s))   CBC with platelets    Collection Time: 03/29/23  3:04 PM   Result Value Ref Range    WBC Count 7.9 4.0 - 11.0 10e3/uL    RBC Count 5.53 4.40 - 5.90 10e6/uL    Hemoglobin 15.9 13.3 - 17.7 g/dL    Hematocrit 45.1 40.0 - 53.0 %    MCV 82 78 - 100 fL    MCH 28.8 26.5 - 33.0 pg    MCHC 35.3 31.5 - 36.5 g/dL    RDW 12.7 10.0 - 15.0 %    Platelet Count 310 150 - 450 10e3/uL   Basic metabolic panel  (Ca, Cl, CO2, Creat, Gluc, K, Na, BUN)    Collection Time: 03/29/23  3:04 PM   Result Value Ref Range    Sodium 142 136 - 145 mmol/L    Potassium 4.4 3.4 - 5.3 mmol/L    Chloride 101 98 - 107 mmol/L    Carbon Dioxide (CO2) 28 22 - 29 mmol/L    Anion Gap 13 7 - 15 mmol/L    Urea Nitrogen 11.8 6.0 - 20.0 mg/dL    Creatinine 0.74 0.67 - 1.17 mg/dL    Calcium 10.5 (H) 8.6 - 10.0 mg/dL    Glucose 82 70 - 99 mg/dL    GFR Estimate >90 >60 mL/min/1.73m2      No EKG required for low risk surgery (cataract, skin procedure, breast biopsy, etc).    Revised Cardiac Risk Index (RCRI):  The patient has the following serious cardiovascular risks for perioperative complications:   - No serious cardiac risks = 0 points     RCRI Interpretation: 0 points: Class I (very low risk - 0.4%  complication rate)    Signed Electronically by: REZA Gold CNP  Copy of this evaluation report is provided to requesting physician.

## 2023-03-29 NOTE — PATIENT INSTRUCTIONS
For informational purposes only. Not to replace the advice of your health care provider. Copyright   2003,  [facility]. All rights reserved. Clinically reviewed by Luann Cano MD. [employer] [id number] - REV .  Preparing for Your Surgery  Getting started  A nurse will call you to review your health history and instructions. They will give you an arrival time based on your scheduled surgery time. Please be ready to share:    Your doctor's clinic name and phone number    Your medical, surgical, and anesthesia history    A list of allergies and sensitivities    A list of medicines, including herbal treatments and over-the-counter drugs    Whether the patient has a legal guardian (ask how to send us the papers in advance)  Please tell us if you're pregnant--or if there's any chance you might be pregnant. Some surgeries may injure a fetus (unborn baby), so they require a pregnancy test. Surgeries that are safe for a fetus don't always need a test, and you can choose whether to have one.   If you have a child who's having surgery, please ask for a copy of Preparing for Your Child's Surgery.    Preparing for surgery    Within 10 to 30 days of surgery: Have a pre-op exam (sometimes called an H&P, or History and Physical). This can be done at a clinic or pre-operative center.  ? If you're having a , you may not need this exam. Talk to your care team.    At your pre-op exam, talk to your care team about all medicines you take. If you need to stop any medicines before surgery, ask when to start taking them again.  ? We do this for your safety. Many medicines can make you bleed too much during surgery. Some change how well surgery (anesthesia) drugs work.    Call your insurance company to let them know you're having surgery. (If you don't have insurance, call [phone].)    Call your clinic if there's any change in your health. This includes signs of a cold or flu (sore throat, runny nose,  cough, rash, fever). It also includes a scrape or scratch near the surgery site.    If you have questions on the day of surgery, call your hospital or surgery center.  Eating and drinking guidelines  For your safety: Unless your surgeon tells you otherwise, follow the guidelines below.    Eat and drink as usual until 8 hours before you arrive for surgery. After that, no food or milk.    Drink clear liquids until 2 hours before you arrive. These are liquids you can see through, like water, Gatorade, and Propel Water. They also include plain black coffee and tea (no cream or milk), candy, and breath mints. You can spit out gum when you arrive.    If you drink alcohol: Stop drinking it the night before surgery.    If your care team tells you to take medicine on the morning of surgery, it's okay to take it with a sip of water.  Preventing infection    Shower or bathe the night before and morning of your surgery. Follow the instructions your clinic gave you. (If no instructions, use regular soap.)    Don't shave or clip hair near your surgery site. We'll remove the hair if needed.    Don't smoke or vape the morning of surgery. You may chew nicotine gum up to 2 hours before surgery. A nicotine patch is okay.  ? Note: Some surgeries require you to completely quit smoking and nicotine. Check with your surgeon.    Your care team will make every effort to keep you safe from infection. We will:  ? Clean our hands often with soap and water (or an alcohol-based hand rub).  ? Clean the skin at your surgery site with a special soap that kills germs.  ? Give you a special gown to keep you warm. (Cold raises the risk of infection.)  ? Wear special hair covers, masks, gowns and gloves during surgery.  ? Give antibiotic medicine, if prescribed. Not all surgeries need antibiotics.  What to bring on the day of surgery    Photo ID and insurance card    Copy of your health care directive, if you have one    Glasses and hearing aids (bring  cases)  ? You can't wear contacts during surgery    Inhaler and eye drops, if you use them (tell us about these when you arrive)    CPAP machine or breathing device, if you use them    A few personal items, if spending the night    If you have . . .  ? A pacemaker, ICD (cardiac defibrillator) or other implant: Bring the ID card.  ? An implanted stimulator: Bring the remote control.  ? A legal guardian: Bring a copy of the certified (court-stamped) guardianship papers.  Please remove any jewelry, including body piercings. Leave jewelry and other valuables at home.  If you're going home the day of surgery    You must have a responsible adult drive you home. They should stay with you overnight as well.    If you don't have someone to stay with you, and you aren't safe to go home alone, we may keep you overnight. Insurance often won't pay for this.  After surgery  If it's hard to control your pain or you need more pain medicine, please call your surgeon's office.  Questions?   If you have any questions for your care team, list them here: _________________________________________________________________________________________________________________________________________________________________________ ____________________________________ ____________________________________ ____________________________________

## 2023-03-30 ASSESSMENT — ENCOUNTER SYMPTOMS
PALPITATIONS: 0
ARTHRALGIAS: 0
EYE PAIN: 0
CHILLS: 0
HEADACHES: 0
MYALGIAS: 0
WEAKNESS: 0
ABDOMINAL PAIN: 0
DIARRHEA: 0
DIZZINESS: 0
PARESTHESIAS: 0
NUMBNESS: 0
DIFFICULTY URINATING: 0
COUGH: 0
CONSTIPATION: 0
FATIGUE: 0
DIAPHORESIS: 0
SHORTNESS OF BREATH: 0

## 2023-03-31 NOTE — PRE-PROCEDURE
ORAL & MAXILLOFACIAL SURGERY   PREOPERATIVE  NOTE:  Sudeep Aguayo,  MRN: [id number],  : 2000      Date of Procedure:   2023    Pre-Operative Diagnosis:  1.Facial dysmorphia     Planned Procedure:  1.REMOVAL, HARDWARE, FACE    Planned Anesthesia: General via nasal endotracheal tube    Attending Surgeon:  Dr. Parviz Saunders DDS    Resident Surgeon:  Dr. Torin Vicente DDS    Resident Assistants:  Dr. Zach Correa DDS      Consent:  Written and verbal consent obtained from patient previously. Discussion included  risks/complications including but not limited post-operative pain, swelling, bleeding,  infection, hardware failure, nonunion, malunion, dehiscence of wounds,  temporary/permanent paresthesia/anesthesia of CN V3 mental nerve distribution/CN V2  maxillary nerve distribution, damage to CN VII (motor to muscles of facial expression)  with temporary or permanent paralysis, scar formation, malocclusion, failure to resolve  chief complaint, or need for additional procedures (occlusal equilibration/endodontic  therapy). Patient agrees to procedure as written, signed consent in chart.      Brady Wilkins DDS  Oral & Maxillofacial Surgery, [facility]    ------------------------------------------------------------------------------------------  ORAL & MAXILLOFACIAL SURGERY FOLLOW-UP:    S: 22-year-old male presents s/p LeFort I osteotomy with application of maxillary distractors on 10/26/2022.  Mother reports patient has not experienced pain and that edema has largely resolved.  Patient and mother expressed desire to have distractors removed.  Patient and mother deny other constitutional symptoms.  Patient has been in consolidation stage since 2022.    PHYSICAL EXAM:  GEN: WD/WN , NAD  Neuro: AAOx4, CN V and CN VII intact    HEENT: NC/AT, EOMI, PERRL, receding facial edema consistent with procedure, induration or erythema, no abnormal skin lesions, inferior border of mandible is palpable  bilaterally, neck soft with no palpable lymph nodes, DEMAR >45mm, OP clear, uvula midline, fair oral hygiene, intact adult dentition, no vestibular edema, FOM ND/NT, no erythema, no ulcerations, no pigmentations, no exophytic lesions, oral cancer screen negative.  Poor OHI noted in posterior dentition and surrounding distractors.    A: Patient healing as expected from LeFort osteotomy and distraction.  Patient ready to be scheduled in OR for distractor removal.    PLAN  1. Physical exam findings shared and discussed with the patient at length  2.  Take patient to OR for maxillary distractor removal bilaterally.  3.  Mother informed of expected course of treatment including orthodontic treatment and possible subsequent orthognathic treatment indications.  4.  All questions answered  5. Patient to obtain pre-op H&P from primary care provider prior to procedure.     Emily Sanchez DDS  [facility] PGY-1  Findings, assessment, and plan discussed with Dr. Parviz Saunders MD, DDS

## 2023-04-03 ENCOUNTER — ANESTHESIA EVENT (OUTPATIENT)
Dept: SURGERY | Facility: CLINIC | Age: 23
End: 2023-04-03
Payer: COMMERCIAL

## 2023-04-04 ENCOUNTER — ANESTHESIA (OUTPATIENT)
Dept: SURGERY | Facility: CLINIC | Age: 23
End: 2023-04-04
Payer: COMMERCIAL

## 2023-04-04 ENCOUNTER — HOSPITAL ENCOUNTER (OUTPATIENT)
Facility: CLINIC | Age: 23
Discharge: HOME | End: 2023-04-04
Attending: DENTIST | Admitting: DENTIST
Payer: COMMERCIAL

## 2023-04-04 VITALS
HEIGHT: 64 IN | WEIGHT: 110.23 LBS | SYSTOLIC BLOOD PRESSURE: 107 MMHG | OXYGEN SATURATION: 97 % | HEART RATE: 85 BPM | TEMPERATURE: 99 F | BODY MASS INDEX: 18.82 KG/M2 | DIASTOLIC BLOOD PRESSURE: 75 MMHG | RESPIRATION RATE: 14 BRPM

## 2023-04-04 DIAGNOSIS — Q89.8 COFFIN-LOWRY SYNDROME: Primary | ICD-10-CM

## 2023-04-04 LAB — GLUCOSE BLDC GLUCOMTR-MCNC: 80 MG/DL (ref 70–99)

## 2023-04-04 PROCEDURE — 272N000001 HC OR GENERAL SUPPLY STERILE: Performed by: DENTIST

## 2023-04-04 PROCEDURE — 999N000141 HC STATISTIC PRE-PROCEDURE NURSING ASSESSMENT: Performed by: DENTIST

## 2023-04-04 PROCEDURE — 710N000010 HC RECOVERY PHASE 1, LEVEL 2, PER MIN: Performed by: DENTIST

## 2023-04-04 PROCEDURE — 360N000076 HC SURGERY LEVEL 3, PER MIN: Performed by: DENTIST

## 2023-04-04 PROCEDURE — 250N000025 HC SEVOFLURANE, PER MIN: Performed by: DENTIST

## 2023-04-04 PROCEDURE — 258N000003 HC RX IP 258 OP 636: Performed by: NURSE ANESTHETIST, CERTIFIED REGISTERED

## 2023-04-04 PROCEDURE — 250N000009 HC RX 250: Performed by: NURSE ANESTHETIST, CERTIFIED REGISTERED

## 2023-04-04 PROCEDURE — 82962 GLUCOSE BLOOD TEST: CPT

## 2023-04-04 PROCEDURE — 250N000011 HC RX IP 250 OP 636: Performed by: STUDENT IN AN ORGANIZED HEALTH CARE EDUCATION/TRAINING PROGRAM

## 2023-04-04 PROCEDURE — 250N000011 HC RX IP 250 OP 636: Performed by: NURSE ANESTHETIST, CERTIFIED REGISTERED

## 2023-04-04 PROCEDURE — 250N000009 HC RX 250: Performed by: DENTIST

## 2023-04-04 PROCEDURE — 370N000017 HC ANESTHESIA TECHNICAL FEE, PER MIN: Performed by: DENTIST

## 2023-04-04 PROCEDURE — 250N000013 HC RX MED GY IP 250 OP 250 PS 637: Performed by: STUDENT IN AN ORGANIZED HEALTH CARE EDUCATION/TRAINING PROGRAM

## 2023-04-04 PROCEDURE — 710N000012 HC RECOVERY PHASE 2, PER MINUTE: Performed by: DENTIST

## 2023-04-04 RX ORDER — KETOROLAC TROMETHAMINE 30 MG/ML
30 INJECTION, SOLUTION INTRAMUSCULAR; INTRAVENOUS EVERY 6 HOURS
Status: DISCONTINUED | OUTPATIENT
Start: 2023-04-04 | End: 2023-04-04 | Stop reason: HOSPADM

## 2023-04-04 RX ORDER — CHLORHEXIDINE GLUCONATE ORAL RINSE 1.2 MG/ML
10 SOLUTION DENTAL ONCE
Status: COMPLETED | OUTPATIENT
Start: 2023-04-04 | End: 2023-04-04

## 2023-04-04 RX ORDER — CEFAZOLIN SODIUM/WATER 2 G/20 ML
2 SYRINGE (ML) INTRAVENOUS
Status: COMPLETED | OUTPATIENT
Start: 2023-04-04 | End: 2023-04-04

## 2023-04-04 RX ORDER — ACETAMINOPHEN 325 MG/10.15ML
650 LIQUID ORAL EVERY 6 HOURS
Status: DISCONTINUED | OUTPATIENT
Start: 2023-04-04 | End: 2023-04-04 | Stop reason: HOSPADM

## 2023-04-04 RX ORDER — OXYCODONE HCL 5 MG/5 ML
5 SOLUTION, ORAL ORAL EVERY 4 HOURS PRN
Status: DISCONTINUED | OUTPATIENT
Start: 2023-04-04 | End: 2023-04-04 | Stop reason: HOSPADM

## 2023-04-04 RX ORDER — OXYCODONE HCL 5 MG/5 ML
5 SOLUTION, ORAL ORAL
Qty: 60 ML | Refills: 0 | Status: CANCELLED | OUTPATIENT
Start: 2023-04-04

## 2023-04-04 RX ORDER — KETOROLAC TROMETHAMINE 30 MG/ML
INJECTION, SOLUTION INTRAMUSCULAR; INTRAVENOUS PRN
Status: DISCONTINUED | OUTPATIENT
Start: 2023-04-04 | End: 2023-04-04

## 2023-04-04 RX ORDER — PROPOFOL 10 MG/ML
INJECTION, EMULSION INTRAVENOUS PRN
Status: DISCONTINUED | OUTPATIENT
Start: 2023-04-04 | End: 2023-04-04

## 2023-04-04 RX ORDER — FENTANYL CITRATE 50 UG/ML
25 INJECTION, SOLUTION INTRAMUSCULAR; INTRAVENOUS EVERY 5 MIN PRN
Status: DISCONTINUED | OUTPATIENT
Start: 2023-04-04 | End: 2023-04-04 | Stop reason: HOSPADM

## 2023-04-04 RX ORDER — ONDANSETRON 2 MG/ML
INJECTION INTRAMUSCULAR; INTRAVENOUS PRN
Status: DISCONTINUED | OUTPATIENT
Start: 2023-04-04 | End: 2023-04-04

## 2023-04-04 RX ORDER — KETOROLAC TROMETHAMINE 30 MG/ML
30 INJECTION, SOLUTION INTRAMUSCULAR; INTRAVENOUS ONCE
Status: DISCONTINUED | OUTPATIENT
Start: 2023-04-04 | End: 2023-04-04 | Stop reason: HOSPADM

## 2023-04-04 RX ORDER — IBUPROFEN 100 MG/5ML
600 SUSPENSION, ORAL (FINAL DOSE FORM) ORAL EVERY 6 HOURS PRN
Qty: 473 ML | Refills: 1 | Status: SHIPPED | OUTPATIENT
Start: 2023-04-04 | End: 2023-04-09

## 2023-04-04 RX ORDER — ONDANSETRON 4 MG/1
4 TABLET, ORALLY DISINTEGRATING ORAL EVERY 30 MIN PRN
Status: DISCONTINUED | OUTPATIENT
Start: 2023-04-04 | End: 2023-04-04 | Stop reason: HOSPADM

## 2023-04-04 RX ORDER — HYDROMORPHONE HCL IN WATER/PF 6 MG/30 ML
0.4 PATIENT CONTROLLED ANALGESIA SYRINGE INTRAVENOUS EVERY 5 MIN PRN
Status: DISCONTINUED | OUTPATIENT
Start: 2023-04-04 | End: 2023-04-04 | Stop reason: HOSPADM

## 2023-04-04 RX ORDER — OXYCODONE HCL 5 MG/5 ML
5 SOLUTION, ORAL ORAL EVERY 6 HOURS PRN
Qty: 60 ML | Refills: 0 | Status: SHIPPED | OUTPATIENT
Start: 2023-04-04

## 2023-04-04 RX ORDER — FENTANYL CITRATE 50 UG/ML
INJECTION, SOLUTION INTRAMUSCULAR; INTRAVENOUS PRN
Status: DISCONTINUED | OUTPATIENT
Start: 2023-04-04 | End: 2023-04-04

## 2023-04-04 RX ORDER — IBUPROFEN 100 MG/5ML
600 SUSPENSION, ORAL (FINAL DOSE FORM) ORAL EVERY 6 HOURS PRN
Status: DISCONTINUED | OUTPATIENT
Start: 2023-04-04 | End: 2023-04-04 | Stop reason: HOSPADM

## 2023-04-04 RX ORDER — SODIUM CHLORIDE, SODIUM LACTATE, POTASSIUM CHLORIDE, CALCIUM CHLORIDE 600; 310; 30; 20 MG/100ML; MG/100ML; MG/100ML; MG/100ML
INJECTION, SOLUTION INTRAVENOUS CONTINUOUS
Status: DISCONTINUED | OUTPATIENT
Start: 2023-04-04 | End: 2023-04-04 | Stop reason: HOSPADM

## 2023-04-04 RX ORDER — LIDOCAINE HYDROCHLORIDE 20 MG/ML
INJECTION, SOLUTION INFILTRATION; PERINEURAL PRN
Status: DISCONTINUED | OUTPATIENT
Start: 2023-04-04 | End: 2023-04-04

## 2023-04-04 RX ORDER — ONDANSETRON 2 MG/ML
4 INJECTION INTRAMUSCULAR; INTRAVENOUS EVERY 30 MIN PRN
Status: DISCONTINUED | OUTPATIENT
Start: 2023-04-04 | End: 2023-04-04 | Stop reason: HOSPADM

## 2023-04-04 RX ORDER — CEFAZOLIN SODIUM/WATER 2 G/20 ML
2 SYRINGE (ML) INTRAVENOUS SEE ADMIN INSTRUCTIONS
Status: DISCONTINUED | OUTPATIENT
Start: 2023-04-04 | End: 2023-04-04 | Stop reason: HOSPADM

## 2023-04-04 RX ORDER — AMOXICILLIN AND CLAVULANATE POTASSIUM 400; 57 MG/5ML; MG/5ML
875 POWDER, FOR SUSPENSION ORAL 2 TIMES DAILY
Qty: 153.2 ML | Refills: 0 | Status: SHIPPED | OUTPATIENT
Start: 2023-04-04 | End: 2023-04-11

## 2023-04-04 RX ORDER — SODIUM CHLORIDE, SODIUM LACTATE, POTASSIUM CHLORIDE, CALCIUM CHLORIDE 600; 310; 30; 20 MG/100ML; MG/100ML; MG/100ML; MG/100ML
INJECTION, SOLUTION INTRAVENOUS CONTINUOUS PRN
Status: DISCONTINUED | OUTPATIENT
Start: 2023-04-04 | End: 2023-04-04

## 2023-04-04 RX ORDER — DEXAMETHASONE SODIUM PHOSPHATE 4 MG/ML
INJECTION, SOLUTION INTRA-ARTICULAR; INTRALESIONAL; INTRAMUSCULAR; INTRAVENOUS; SOFT TISSUE PRN
Status: DISCONTINUED | OUTPATIENT
Start: 2023-04-04 | End: 2023-04-04

## 2023-04-04 RX ORDER — BUPIVACAINE HYDROCHLORIDE AND EPINEPHRINE 2.5; 5 MG/ML; UG/ML
INJECTION, SOLUTION INFILTRATION; PERINEURAL PRN
Status: DISCONTINUED | OUTPATIENT
Start: 2023-04-04 | End: 2023-04-04 | Stop reason: HOSPADM

## 2023-04-04 RX ORDER — OXYCODONE HCL 5 MG/5 ML
5 SOLUTION, ORAL ORAL EVERY 6 HOURS PRN
Qty: 60 ML | Refills: 0 | Status: SHIPPED | OUTPATIENT
Start: 2023-04-04 | End: 2023-04-04

## 2023-04-04 RX ADMIN — PROPOFOL 100 MG: 10 INJECTION, EMULSION INTRAVENOUS at 13:49

## 2023-04-04 RX ADMIN — Medication 2 G: at 13:46

## 2023-04-04 RX ADMIN — FENTANYL CITRATE 50 MCG: 50 INJECTION, SOLUTION INTRAMUSCULAR; INTRAVENOUS at 13:49

## 2023-04-04 RX ADMIN — FENTANYL CITRATE 50 MCG: 50 INJECTION, SOLUTION INTRAMUSCULAR; INTRAVENOUS at 14:30

## 2023-04-04 RX ADMIN — SODIUM CHLORIDE, POTASSIUM CHLORIDE, SODIUM LACTATE AND CALCIUM CHLORIDE: 600; 310; 30; 20 INJECTION, SOLUTION INTRAVENOUS at 13:45

## 2023-04-04 RX ADMIN — MIDAZOLAM 2 MG: 1 INJECTION INTRAMUSCULAR; INTRAVENOUS at 13:36

## 2023-04-04 RX ADMIN — FENTANYL CITRATE 50 MCG: 50 INJECTION, SOLUTION INTRAMUSCULAR; INTRAVENOUS at 14:07

## 2023-04-04 RX ADMIN — LIDOCAINE HYDROCHLORIDE 100 MG: 20 INJECTION, SOLUTION INFILTRATION; PERINEURAL at 13:49

## 2023-04-04 RX ADMIN — SUGAMMADEX 200 MG: 100 INJECTION, SOLUTION INTRAVENOUS at 14:53

## 2023-04-04 RX ADMIN — Medication 30 MG: at 13:49

## 2023-04-04 RX ADMIN — DEXAMETHASONE SODIUM PHOSPHATE 10 MG: 4 INJECTION, SOLUTION INTRA-ARTICULAR; INTRALESIONAL; INTRAMUSCULAR; INTRAVENOUS; SOFT TISSUE at 14:00

## 2023-04-04 RX ADMIN — ONDANSETRON 4 MG: 2 INJECTION INTRAMUSCULAR; INTRAVENOUS at 14:35

## 2023-04-04 RX ADMIN — KETOROLAC TROMETHAMINE 30 MG: 30 INJECTION, SOLUTION INTRAMUSCULAR at 14:35

## 2023-04-04 ASSESSMENT — LIFESTYLE VARIABLES: TOBACCO_USE: 0

## 2023-04-04 ASSESSMENT — ACTIVITIES OF DAILY LIVING (ADL)
ADLS_ACUITY_SCORE: 35

## 2023-04-04 ASSESSMENT — COPD QUESTIONNAIRES: COPD: 0

## 2023-04-04 NOTE — ANESTHESIA CARE TRANSFER NOTE
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    Anesthesia Type:   General     Note:    Oropharynx: oropharynx clear of all foreign objects and spontaneously breathing  Level of Consciousness: awake  Oxygen Supplementation: nasal cannula  Level of Supplemental Oxygen (L/min / FiO2): 4  Independent Airway: airway patency satisfactory and stable  Dentition: dentition unchanged  Vital Signs Stable: post-procedure vital signs reviewed and stable  Report to RN Given: handoff report given  Patient transferred to: PACU  Comments: Pts IV got pulled at emergence, new IV started in PACU.  Handoff Report: Identifed the Patient, Identified the Reponsible Provider, Reviewed the pertinent medical history, Discussed the surgical course, Reviewed Intra-OP anesthesia mangement and issues during anesthesia, Set expectations for post-procedure period and Allowed opportunity for questions and acknowledgement of understanding      Vitals:  Vitals Value Taken Time   /74 04/04/23 1515   Temp     Pulse 91 04/04/23 1527   Resp 16 04/04/23 1527   SpO2 100 % 04/04/23 1527   Vitals shown include unvalidated device data.    Electronically Signed By: REZA Guzman CRNA  April 4, 2023  3:28 PM

## 2023-04-04 NOTE — ANESTHESIA PREPROCEDURE EVALUATION
Anesthesia Pre-Procedure Evaluation    Patient: Sudeep Aguayo   MRN: [id number] : 2000        Procedure : Procedure(s):  REMOVAL, HARDWARE, FACE          Past Medical History:   Diagnosis Date     Choanal atresia      Choanal atresia      Choanal atresia      Chronic maxillary sinusitis      Chronic maxillary sinusitis      Chronic maxillary sinusitis      Chronic pulmonary aspiration      Chronic pulmonary aspiration      Chronic pulmonary aspiration      Chronic serous otitis media of both ears      Chronic serous otitis media of both ears      Chronic serous otitis media of both ears      Closed fracture of right distal fibula 2004    due to bone cyst     Closed fracture of right distal fibula 2004    due to bone cyst     Closed fracture of right distal fibula 2004    due to bone cyst     Cryptorchidism      Cryptorchidism      Cryptorchidism      Disorder of stomach function and feeding problems in      MATY (gastro esoph reflux)     DU (perforated duodenal ulcer) (H) 2002     DU (perforated duodenal ulcer) (H) 2002     DU (perforated duodenal ulcer) (H) 2002     Echocardiogram abnormal 2003    small PFO     Echocardiogram abnormal 2003    small PFO     Echocardiogram abnormal 2003    small PFO     Esophageal reflux      Esophageal reflux      Esophageal reflux      Failure to thrive in childhood      FTT (failure to thrive) in child      FTT (failure to thrive) in child      FTT (failure to thrive) in child      Hypotonia      Hypotonia      Hypotonia      Lack of expected normal physiological development in childhood     Develop. delay     Lacrimal duct stenosis, bilateral      Lacrimal duct stenosis, bilateral      Lacrimal duct stenosis, bilateral      Positional plagiocephaly     had helmet from 5-8 months of age     Positional plagiocephaly     had helmet from 5-8 months of age     Positional plagiocephaly     had helmet from 5-8 months of age     S/P percutaneous  endoscopic gastrostomy (PEG) tube placement (H)      S/P percutaneous endoscopic gastrostomy (PEG) tube placement (H)      S/P percutaneous endoscopic gastrostomy (PEG) tube placement (H)      Speech delay      Speech delay      Speech delay      Torticollis      Torticollis      Torticollis      Trachea displaced     right arytenoid     Trachea displaced     right arytenoid     Trachea displaced     right arytenoid     Undescended testis      Unspecified disorder of thyroid     Thyroid disease     Unspecified otitis media 2001,2002,2003,2004,2005,    Recurrent otitis     Varicella 09/15/2006     Varicella 09/15/2006     Varicella 09/15/2006     Viral warts, unspecified      Viral warts, unspecified      Viral warts, unspecified       Past Surgical History:   Procedure Laterality Date     BIOPSY SKIN (LOCATION)       BRONCHOSCOPY FLEXIBLE AND RIGID  03/2003     LE FORT ONE Bilateral 10/26/2022    Procedure: PLACEMENT OF MAXILLARY DISTRACTORS;  Surgeon: Parviz Saunders DDS;  Location: [facility] OR     ORCHIOPEXY Bilateral 11/01/2002     OTHER SURGICAL HISTORY  12/2002    Duodenal Ulcer Perforation Repair     PE TUBES       PEG TUBE PLACEMENT  12/2002     REPAIR CHOANAL ATRESIA Bilateral 2001     TONSILLECTOMY & ADENOIDECTOMY       TONSILLECTOMY, ADENOIDECTOMY, MYRINGOTOMY, INSERT TUBE BILATERAL, COMBINED Bilateral 2002    at [facility] REPAIR PERF DUOD/PUJA [facility]-WND/INJ        Allergies   Allergen Reactions     No Known Drug Allergies       Social History     Tobacco Use     Smoking status: Never     Passive exposure: Never     Smokeless tobacco: Never     Tobacco comments:     no vaping   Vaping Use     Vaping status: Never Used   Substance Use Topics     Alcohol use: Never      Wt Readings from Last 1 Encounters:   03/29/23 50.4 kg (111 lb 1.6 oz)        Anesthesia Evaluation   Pt has had prior anesthetic. Type: General.    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   (-) arthritis   GI/Hepatic:     (+) GERD,     Renal/Genitourinary:       Endo:     (+) thyroid problem, hypothyroidism,     Psychiatric/Substance Use:     (+) psychiatric history anxiety (ADHD)     Infectious Disease:  - neg infectious disease ROS  (-) Recent Fever   Malignancy:  - neg malignancy ROS     Other:  - neg other ROS          Physical Exam    Airway        Mallampati: II   TM distance: > 3 FB   Neck ROM: full   Mouth opening: < 3 cm    Respiratory Devices and Support         Dental           Cardiovascular          Rhythm and rate: regular and normal     Pulmonary   pulmonary exam normal        breath sounds clear to auscultation       Other findings: Per ENT Note: Class III skeletal-dental deformity with class III canines and molars. Overjet -9 mm, overbite 5 mm. Maxillary tooth crowding. Poor oral hygiene with heavy plaque, gingiva erythematous. Vestibules and floor of mouth soft, non-tender, non-distended. No intraoral lesions. Tongue and uvula midline, oropharynx clear. DEMAR 40 mm.    OUTSIDE LABS:  CBC:   Lab Results   Component Value Date    WBC 7.9 03/29/2023    HGB 15.9 03/29/2023    HGB 14.9 10/13/2022    HCT 45.1 03/29/2023     03/29/2023     BMP:   Lab Results   Component Value Date     03/29/2023    POTASSIUM 4.4 03/29/2023    CHLORIDE 101 03/29/2023    CO2 28 03/29/2023    BUN 11.8 03/29/2023    CR 0.74 03/29/2023    GLC 82 03/29/2023    GLC 79 10/26/2022     COAGS: No results found for: PTT, INR, FIBR  POC: No results found for: BGM, HCG, HCGS  HEPATIC: No results found for: ALBUMIN, PROTTOTAL, ALT, AST, GGT, ALKPHOS, BILITOTAL, BILIDIRECT, ISREAL  OTHER:   Lab Results   Component Value Date    SHAUNNA 10.5 (H) 03/29/2023    TSH 2.21 02/14/2022        Anesthesia Plan    ASA Status:  3   NPO Status:  NPO Appropriate    Anesthesia Type: General.     - Airway: ETT   Induction: Intravenous, Propofol.   Maintenance: Balanced.   Techniques and Equipment:     - Airway: Video-Laryngoscope     - Lines/Monitors: BIS, 2nd IV     Consents    Anesthesia Plan(s) and associated risks, benefits, and realistic alternatives discussed. Questions answered and patient/representative(s) expressed understanding.    - Discussed:     - Discussed with:  Patient      - Patient is DNR/DNI Status: No         Postoperative Care    Pain management: Multi-modal analgesia, IV analgesics.   PONV prophylaxis: Ondansetron (or other 5HT-3), Dexamethasone or Solumedrol, Background Propofol Infusion     Comments:                Umm Cox MD

## 2023-04-04 NOTE — ANESTHESIA POSTPROCEDURE EVALUATION
Patient: Sudeep Aguayo    Procedure: Procedure(s):  REMOVAL, HARDWARE, FACE       Anesthesia Type:  General    Note:  Disposition: Outpatient   Postop Pain Control: Uneventful            Sign Out: Well controlled pain   PONV: No   Neuro/Psych: Uneventful            Sign Out: Acceptable/Baseline neuro status   Airway/Respiratory: Uneventful            Sign Out: Acceptable/Baseline resp. status   CV/Hemodynamics: Uneventful            Sign Out: Acceptable CV status; No obvious hypovolemia; No obvious fluid overload   Other NRE: NONE   DID A NON-ROUTINE EVENT OCCUR? No    Event details/Postop Comments:  No issues. HDS. Has family to stay with.            Last vitals:  Vitals Value Taken Time   /66 04/04/23 1630   Temp 37.2  C (99  F) 04/04/23 1600   Pulse 92 04/04/23 1638   Resp 17 04/04/23 1638   SpO2 97 % 04/04/23 1638   Vitals shown include unvalidated device data.    Electronically Signed By: Mike Ortega MD  April 4, 2023  4:39 PM

## 2023-04-04 NOTE — OP NOTE
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 Item Serial No.  Lot No. LRB No. Used Action   IMP SCR SYN MATRIX 1.85X6MM SELF DRILL [id number] - SN/A Metallic [employer] IMP SCR SYN MATRIX 1.85X6MM SELF DRILL [id number] N/A [employer] N/A N/A 2 Explanted   Maxillary Distractor Bodies 15mm    N/A  N/A N/A 2 Explanted   Posterior Feet    N/A  N/A N/A 1 Explanted   SCREW XTRN DSTRCT 3.5MM SS DSTRCT OSTEOGENESIS NS - SN/A [employer] SCREW XTRN DSTRCT 3.5MM SS DSTRCT OSTEOGENESIS NS N/A [employer]- N/A N/A 2 Explanted   SCREW BN 6MM 2MM SS ST NS MXL WICHO - SN/A Metallic Hardware[employer] SCREW BN 6MM 2MM SS ST NS MXL WICHO N/A [employer]- N/A N/A 12 Explanted   SCREW BN 8MM 2.4MM SS ST NS MXL WICHO - SN/A Metallic [employer]ware[employer] SCREW BN 8MM 2.4MM SS ST NS MXL WICHO N/A [employer]- N/A N/A 2 Explanted       Specimen Removed:   * No specimens in log *    Complications: none    Indications for Surgery:   Sudeep Aguayo is a 21 y/o male who is s/p lefort I osteotomy with application of maxillary distractors on 10/26/2022. he has since undergone appropriate distraction and consolidation phases and is ready for removal of distractors. The patient was offered removal of bilateral maxillary distractors  in an OR setting. The procedure, benefits, risks, alternatives including no treatment were discussed in detail with the  patient and his father and the patient elected to proceed with the planned surgery.     Procedure description:   On the day of surgery, the patient was seen by the OMFS team in the pre-op holding area.  The procedure, benefits, risks, alternatives including no treatment were discussed again with the patient  And his father and an informed written consent was obtained for the planned procedure.  Patient was transported to the operating room and transferred to the OR table in a supine position.  Airway was secured via oral tube by the anesthesia team. Oral cavity cleansed with chlorhexidine, throat pack placed, 10cc 0.25% marcaine with epi delivered as bilateral maxillary local infiltration. The patient was prepped and draped in a sterile fashion for the planned procedure.  Patient's care was given to the OMFS team for the planned procedure.  A surgical timeout was performed by all members of the team.      Attention turned to Maxilla  Bovie electrocautery used to make maxillary vestibular incision 5mm cephalad to the mucogingival junction from the right maxillary first molar to mesial aspect of canines bilaterally.  #9 mucoperiosteal elevator then used to reflect full thickness mucoperiosteal flap bilaterally. Bilateral infra-orbital nerve and foramen identified. Bilateral zygomaticomaxillary buttresses exposed, thus exposing maxillary distractors bilaterally. Screws and distractors were removed. Osseous union was observed bilaterally with no mobility of maxilla upon removal of distractors. Distraction was measured at 13mm bilaterally. Copious sterile saline irrigation was provided and the procedural site was noted to be hemostatic. Soft tissue was re-approximated primarily with 3-0 chromic gut in running fashion bilaterally. Procedural site was hemostatic.     Thus, the planned procedure completed, throat pack removed, the patient's care was given back to the anesthesia team for the extubation part. The patient was  extubated without any difficulty and transported to the PACU with stable vital signs and breathing spontaneously.     I was told by the OR nurse staff that all needles, sponges, instruments were found to be correct and there were no intraoperative complications noted.     Attending staff was present for the entire duration of the procedure.    Torin Vicente DMD  OMFS Resident, PGY-4

## 2023-04-04 NOTE — ANESTHESIA PROCEDURE NOTES
Airway       Patient location during procedure: OR       Procedure Start/Stop Times: 4/4/2023 1:52 PM  Staff -        CRNA: Zeina Boo APRN CRNA       Performed By: CRNA  Consent for Airway        Urgency: elective  Indications and Patient Condition       Indications for airway management: aurelia-procedural       Induction type:intravenous       Mask difficulty assessment: 2 - vent by mask + OA or adjuvant +/- NMBA    Final Airway Details       Final airway type: endotracheal airway       Successful airway: ETT - single  Endotracheal Airway Details        ETT size (mm): 7.0       Cuffed: yes       Successful intubation technique: video laryngoscopy       VL Blade Size: MAC 3       Grade View of Cords: 1       Adjucts: stylet       Position: Right       Measured from: lips       Secured at (cm): 22       Bite block used: None    Post intubation assessment        Placement verified by: capnometry and chest rise        Number of attempts at approach: 1       Secured with: pink tape       Ease of procedure: easy       Dentition: Intact and Unchanged    Medication(s) Administered   Medication Administration Time: 4/4/2023 1:52 PM

## 2023-04-04 NOTE — DISCHARGE INSTRUCTIONS
[facility]  Same-Day Surgery   Adult Discharge Orders & Instructions     For 24 hours after surgery    Get plenty of rest.  A responsible adult must stay with you for at least 24 hours after you leave the hospital.   Do not drive or use heavy equipment.  If you have weakness or tingling, don't drive or use heavy equipment until this feeling goes away.  Do not drink alcohol.  Avoid strenuous or risky activities.  Ask for help when climbing stairs.   You may feel lightheaded.  IF so, sit for a few minutes before standing.  Have someone help you get up.   If you have nausea (feel sick to your stomach): Drink only clear liquids such as apple juice, ginger ale, broth or 7-Up.  Rest may also help.  Be sure to drink enough fluids.  Move to a regular diet as you feel able.  You may have a slight fever. Call the doctor if your fever is over 100 F (37.7 C) (taken under the tongue) or lasts longer than 24 hours.  You may have a dry mouth, a sore throat, muscle aches or trouble sleeping.  These should go away after 24 hours.  Do not make important or legal decisions.   Call your doctor for any of the followin.  Signs of infection (fever, growing tenderness at the surgery site, a large amount of drainage or bleeding, severe pain, foul-smelling drainage, redness, swelling).    2. It has been over 8 to 10 hours since surgery and you are still not able to urinate (pass water).    3.  Headache for over 24 hours.    To contact a doctor, call Dr Saunders's office at [phone] (Monday-Friday 8:00 am-4:30 pm)   or: [phone] and ask for the resident on call for Oral Surgery (answered 24 hours a day)    '   Emergency Department:    [facility]: [phone]       (TTY for hearing impaired: [phone])    [facility]: [phone]       (TTY for hearing impaired: [phone])

## 2023-04-05 ENCOUNTER — TELEPHONE (OUTPATIENT)
Dept: FAMILY MEDICINE | Facility: CLINIC | Age: 23
End: 2023-04-05
Payer: COMMERCIAL

## 2023-04-05 DIAGNOSIS — E83.52 HYPERCALCEMIA: Primary | ICD-10-CM

## 2023-04-05 NOTE — TELEPHONE ENCOUNTER
Called patients mom and she stated that his procedure went well. He was given pain meds but hasn't had to take any yet. He is also able to eat so that's good. Informed patient of results below and mom stated that she will call back to get this lab appointment scheduled.

## 2023-04-05 NOTE — TELEPHONE ENCOUNTER
----- Message from REZA Gann CNP sent at 4/5/2023  7:36 AM CDT -----  Please call mom and ask how the procedure went yesterday?    We are calling with his lab results, which overall look great, but Sudeep's calcium was slightly elevated. I have placed an order to recheck this and make sure it has comes back down. It is not elevated to the point of major concern at this point, but high calcium consistently can indicate concern so it would be nice to assess his level in about 3 weeks. I have placed the lab order and you can call [phone] to set up a lab appointment to have this recheck.     Please reach out with any questions or concerns,     Enrique Long DNP, REZA, FNP-C  [phone]

## 2024-07-25 ENCOUNTER — THERAPY VISIT (OUTPATIENT)
Dept: SPEECH THERAPY | Facility: REHABILITATION | Age: 24
End: 2024-07-25
Payer: COMMERCIAL

## 2024-07-25 DIAGNOSIS — Z87.898 H/O SPEECH AND LANGUAGE DEFICITS: ICD-10-CM

## 2024-07-25 DIAGNOSIS — F80.0 ARTICULATION DISORDER: Primary | ICD-10-CM

## 2024-07-25 PROCEDURE — 92522 EVALUATE SPEECH PRODUCTION: CPT | Mod: GN | Performed by: SPEECH-LANGUAGE PATHOLOGIST

## 2024-07-25 NOTE — PROGRESS NOTES
SPEECH LANGUAGE PATHOLOGY EVALUATION     Fall Risk Screen:  Fall screen completed by: SLP  Have you fallen 2 or more times in the past year?: No  Have you fallen and had an injury in the past year?: No  Is patient a fall risk?: No    Subjective      Presenting condition or subjective complaint: can't understand him  Patient is a 24 year old male who presents to evaluation with speech concerns.  He is accompanied to the evaluation by his mother who provides the majority of his medical history, as limited history noted per EMR.     Per patient's mother, she has a difficult time understanding him, rating his intelligibility to be ~70%.  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  She noted additional concerns with hypernasality and a runny nose- has seen ENT in the past as well has had a sinuplasty with minimal reported benefit.    Patient did have jaw surgery (i.e., Bilateral maxillary distractor placement) in October 2022.  After this, patient's mother endorses mildly decreased speech intelligibility.  He will be seeing ENT again on 8/22/24.  Date of onset: 07/23/24 (order date)    Relevant medical history:   See EMR  Dates & types of surgery:  Jaw surgery October 2022, Sinuplasty,     Prior diagnostic imaging/testing results: CT scan     Prior therapy history for the same diagnosis, illness or injury:    Yes, seen through the schools    Living Environment  Social support: With family members   Help at home: None  Equipment owned:       Employment: No   Currently working with a   Hobbies/Interests: like sports    Patient goals for therapy: figure out why he sounds like this    Pain assessment:  No specific pain reported during the evaluation     Objective     ORAL MOTOR FUNCTION:  anomalies present  Dentition: natural dentition  Secretion management: WFL  Mucosal quality: good  Mandibular function:  "Reduced jaw opening, history of jaw surgery for under bite  Oral labial function: WFL, Patient demonstrates extra tissue fold inside upper lip with smile since jaw surgery  Lingual function: impaired protrusion, impaired coordination      MOTOR SPEECH:  Speech Intelligibility:     Word level speech intelligibility: moderate impairment, A portion of the [place] Dysarthria Assessment was administered.  Word Level: 7/10 to unfamiliar communication partner with context unknown.       Phrase/sentence level speech intelligibility: moderate impairment, A portion of the [place] Dysarthria Assessment was administered.  Phrase Level: 5/10 to unfamiliar communication partner with context unknown.       Conversation level speech intelligibility: moderate impairment   Respiration Observations: WNL   Phonation: WNL   Resonance: hypernasal, nasal emissions, Patient presents with hypernasal speech.  Small mirror utilized under nose to assess nasal emission.  Present during nasal sounds (I.e., m, n); however, also noted during back sounds (I.e., k, g)  Rate/prosody: rapid rate, intermittently    Articulation:  Given articulation screener consisting of 52 words with speech sounds in the initial and final position of words, patient presented with speech sound errors on 25/52 including fronting (I.e., \"Ditar\" for guitar, \"trib\" for crib), frontal lisp (I.e., \"thovel\" for shovel, \"drether\" for dresser), cluster reduction (I.e., \"fog\" for frog, \"thunk\" for skunk), and nasal emission.    Diadochokinetic rates: Minimally assessed- patient completed x3 repetitions of each with adequate production/rate; however, would not participate in further.  Speech Fluency: WNL   Motor speech level of impairment: moderate impairment     Assessment & Plan   CLINICAL IMPRESSIONS   Medical Diagnosis: H/O speech and language deficits (Z87.898)  - Primary    Treatment Diagnosis: Articulation Disorder   Impression/Assessment: Pt is a 24 year old male with " speech complaints, present since childhood. The following significant findings have been identified: impaired communication, characterized by articulation errors including frontal lisp, consonant cluster reduction, and fronting, reduced lingual ROM and coordination, and hypernasal speech. Identified deficits interfere with their ability to communicate wants and needs and communicate within the home or community as compared to previous level of function.    PLAN OF CARE  Treatment Interventions: Speech    Prognosis to achieve stated therapy goals is fair given longevity of speech sounds and long history of prior speech therapy; however, noted decreased precision s/p jaw surgery in 2022  Rehab potential is impacted by: current level of function, prior level of function    Long Term Goals:   SLP Goal 1  Goal Identifier: 1. Communication partner/environmental factors  Goal Description: Patient will learn and demonstrate/report consistent use of x3 communication partner (e.g., providing context, face to face setting) or environmental (e.g., reducing background noise) factors to improve communication.  Rationale: To maximize functional communication within the home or community;To maximize the ability to communicate wants and needs within the home or community  Target Date: 10/23/24  SLP Goal 2  Goal Identifier: 2. s  Goal Description: Patient will demonstrate adequate lingual placement for 's' sound in all word positions at word level with >90% accuracy independently.  Rationale: To maximize functional communication within the home or community;To maximize the ability to communicate wants and needs within the home or community  Target Date: 10/23/24  SLP Goal 3  Goal Identifier: 3. back sounds  Goal Description: Patient will demonstrate adequate k/g sounds in all word positions at word level with >95% accuracy RIZWANA.  Rationale: To maximize functional communication within the home or community;To maximize the ability to  communicate wants and needs within the home or community  Target Date: 10/23/24      Frequency of Treatment: weekly  Duration of Treatment: 8 weeks     Recommended Referrals to Other Professionals:  ENT given hypernasal speech  Education Assessment:   Learner/Method: Patient;Family;Demonstration;Listening  Education Comments: Speech sound production, structural change impact, hypernasal speech, expected functional outcomes given longevity of articulation errors    Risks and benefits of evaluation/treatment have been explained.   Patient/Family/caregiver agrees with Plan of Care.     Evaluation Time:    Sound production (artic, phonology, apraxia, dysarthria) Minutes (64568): 40    Signing Clinician: Bonnie Wang, SLP

## 2024-08-01 ENCOUNTER — THERAPY VISIT (OUTPATIENT)
Dept: SPEECH THERAPY | Facility: REHABILITATION | Age: 24
End: 2024-08-01
Payer: COMMERCIAL

## 2024-08-01 DIAGNOSIS — F80.0 ARTICULATION DISORDER: Primary | ICD-10-CM

## 2024-08-01 PROCEDURE — 92507 TX SP LANG VOICE COMM INDIV: CPT | Mod: GN | Performed by: SPEECH-LANGUAGE PATHOLOGIST

## 2024-08-08 ENCOUNTER — THERAPY VISIT (OUTPATIENT)
Dept: SPEECH THERAPY | Facility: REHABILITATION | Age: 24
End: 2024-08-08
Payer: COMMERCIAL

## 2024-08-08 DIAGNOSIS — F80.0 ARTICULATION DISORDER: Primary | ICD-10-CM

## 2024-08-08 PROCEDURE — 92507 TX SP LANG VOICE COMM INDIV: CPT | Mod: GN | Performed by: SPEECH-LANGUAGE PATHOLOGIST

## 2024-08-15 ENCOUNTER — THERAPY VISIT (OUTPATIENT)
Dept: SPEECH THERAPY | Facility: REHABILITATION | Age: 24
End: 2024-08-15
Payer: COMMERCIAL

## 2024-08-15 DIAGNOSIS — F80.0 ARTICULATION DISORDER: Primary | ICD-10-CM

## 2024-08-15 PROCEDURE — 92507 TX SP LANG VOICE COMM INDIV: CPT | Mod: GN | Performed by: SPEECH-LANGUAGE PATHOLOGIST

## 2024-08-22 ENCOUNTER — THERAPY VISIT (OUTPATIENT)
Dept: SPEECH THERAPY | Facility: REHABILITATION | Age: 24
End: 2024-08-22
Payer: COMMERCIAL

## 2024-08-22 DIAGNOSIS — F80.0 ARTICULATION DISORDER: Primary | ICD-10-CM

## 2024-08-22 PROCEDURE — 92507 TX SP LANG VOICE COMM INDIV: CPT | Mod: GN | Performed by: SPEECH-LANGUAGE PATHOLOGIST

## 2024-08-31 ENCOUNTER — ANCILLARY PROCEDURE (OUTPATIENT)
Dept: GENERAL RADIOLOGY | Facility: CLINIC | Age: 24
End: 2024-08-31
Attending: PEDIATRICS
Payer: COMMERCIAL

## 2024-08-31 ENCOUNTER — OFFICE VISIT (OUTPATIENT)
Dept: URGENT CARE | Facility: URGENT CARE | Age: 24
End: 2024-08-31
Payer: COMMERCIAL

## 2024-08-31 VITALS
DIASTOLIC BLOOD PRESSURE: 75 MMHG | TEMPERATURE: 99.1 F | SYSTOLIC BLOOD PRESSURE: 113 MMHG | HEART RATE: 98 BPM | OXYGEN SATURATION: 99 % | RESPIRATION RATE: 16 BRPM

## 2024-08-31 DIAGNOSIS — S91.331A PUNCTURE WOUND OF RIGHT FOOT, INITIAL ENCOUNTER: ICD-10-CM

## 2024-08-31 DIAGNOSIS — S91.331A PUNCTURE WOUND OF RIGHT FOOT, INITIAL ENCOUNTER: Primary | ICD-10-CM

## 2024-08-31 PROCEDURE — 73630 X-RAY EXAM OF FOOT: CPT | Mod: TC | Performed by: RADIOLOGY

## 2024-08-31 PROCEDURE — 99213 OFFICE O/P EST LOW 20 MIN: CPT | Performed by: PEDIATRICS

## 2024-08-31 ASSESSMENT — ENCOUNTER SYMPTOMS
BRUISES/BLEEDS EASILY: 0
CONSTITUTIONAL NEGATIVE: 1
JOINT SWELLING: 0
WOUND: 1
WEAKNESS: 0
NUMBNESS: 0

## 2024-08-31 NOTE — PROGRESS NOTES
Patient presents with:  Trauma: Pt was trying to catch a water bottle and stepped on a yoon nail on the ground, nail went into right bottom sole      Clinical Decision Making:      ICD-10-CM    1. Puncture wound of right foot, initial encounter  S91.331A XR Foot Right G/E 3 Views     CANCELED: XR Foot Right G/E 3 Views      - X-ray shows no fracture or retained body.   - Bacitracin to the wound with bandage. Keep clean. If swelling increases or erythema spreads, return to clinic for evaluation  - Warm soaks with soapy water applied here in clinic.   - Up to date on tetanus ppx    There are no Patient Instructions on file for this visit.    HPI:  Sudeep Aguayo is a 24 year old male who presents today complaining of stepping on a yoon nail. Had blood on sock. Unsure how deep it went. Tender when stepped on. Didn't clean it right away. Last Tdap was 2020.     History obtained from mother and the patient.    Problem List:  2022-02: Anxiety disorder due to general medical condition  2022-02: Attention deficit hyperactivity disorder (ADHD)  2022-02: [place] syndrome  2022-02: Delayed developmental milestones  2022-02: Mild intellectual disability  2022-02: Neurodevelopmental disorder  2022-02: Short stature disorder  2017-06: Subclinical hypothyroidism  2016-01: Vitamin D deficiency  2014-11: Anxiety state  Hypothyroidism      Past Medical History:   Diagnosis Date    Choanal atresia     Choanal atresia     Choanal atresia     Chronic maxillary sinusitis     Chronic maxillary sinusitis     Chronic maxillary sinusitis     Chronic pulmonary aspiration     Chronic pulmonary aspiration     Chronic pulmonary aspiration     Chronic serous otitis media of both ears     Chronic serous otitis media of both ears     Chronic serous otitis media of both ears     Closed fracture of right distal fibula 11/2004    due to bone cyst    Closed fracture of right distal fibula 11/2004    due to bone cyst    Closed fracture of right  distal fibula 2004    due to bone cyst    Cryptorchidism     Cryptorchidism     Cryptorchidism     Disorder of stomach function and feeding problems in      MATY (gastro esoph reflux)    DU (perforated duodenal ulcer) (H) 2002    DU (perforated duodenal ulcer) (H) 2002    DU (perforated duodenal ulcer) (H) 2002    Echocardiogram abnormal 2003    small PFO    Echocardiogram abnormal 2003    small PFO    Echocardiogram abnormal 2003    small PFO    Esophageal reflux     Esophageal reflux     Esophageal reflux     Failure to thrive in childhood     FTT (failure to thrive) in child     FTT (failure to thrive) in child     FTT (failure to thrive) in child     Hypotonia     Hypotonia     Hypotonia     Lack of expected normal physiological development in childhood     Develop. delay    Lacrimal duct stenosis, bilateral     Lacrimal duct stenosis, bilateral     Lacrimal duct stenosis, bilateral     Positional plagiocephaly     had helmet from 5-8 months of age    Positional plagiocephaly     had helmet from 5-8 months of age    Positional plagiocephaly     had helmet from 5-8 months of age    S/P percutaneous endoscopic gastrostomy (PEG) tube placement (H)     S/P percutaneous endoscopic gastrostomy (PEG) tube placement (H)     S/P percutaneous endoscopic gastrostomy (PEG) tube placement (H)     Speech delay     Speech delay     Speech delay     Torticollis     Torticollis     Torticollis     Trachea displaced     right arytenoid    Trachea displaced     right arytenoid    Trachea displaced     right arytenoid    Undescended testis     Unspecified disorder of thyroid     Thyroid disease    Unspecified otitis media ,,,,2005,    Recurrent otitis    Varicella 09/15/2006    Varicella 09/15/2006    Varicella 09/15/2006    Viral warts, unspecified     Viral warts, unspecified     Viral warts, unspecified        Social History     Tobacco Use    Smoking status: Never     Passive exposure:  Never    Smokeless tobacco: Never    Tobacco comments:     no vaping   Substance Use Topics    Alcohol use: Never       Review of Systems   Constitutional: Negative.    Musculoskeletal:  Positive for gait problem. Negative for joint swelling.   Skin:  Positive for wound.   Neurological:  Negative for weakness and numbness.   Hematological:  Does not bruise/bleed easily.       Vitals:    08/31/24 1304   BP: 113/75   Pulse: 98   Resp: 16   Temp: 99.1  F (37.3  C)   TempSrc: Tympanic   SpO2: 99%       Physical Exam  Constitutional:       General: He is not in acute distress.     Appearance: Normal appearance. He is not toxic-appearing.   HENT:      Head: Normocephalic and atraumatic.      Nose: Nose normal.      Mouth/Throat:      Mouth: Mucous membranes are moist.      Pharynx: Oropharynx is clear.   Eyes:      Conjunctiva/sclera: Conjunctivae normal.   Musculoskeletal:         General: Tenderness and signs of injury present. No swelling or deformity. Normal range of motion.      Cervical back: Normal range of motion.   Skin:     General: Skin is warm and dry.      Capillary Refill: Capillary refill takes less than 2 seconds.      Findings: Erythema and lesion present. No bruising.   Neurological:      General: No focal deficit present.      Mental Status: He is alert.      Sensory: No sensory deficit.      Motor: No weakness.      Gait: Gait abnormal.         Results:  Results for orders placed or performed in visit on 08/31/24   XR Foot Right G/E 3 Views     Status: None    Narrative    EXAM: XR FOOT RIGHT G/E 3 VIEWS  LOCATION: [facility]  DATE: 08/31/2024    INDICATION: Puncture wound of right foot, initial encounter.  COMPARISON: None.      Impression    IMPRESSION: There is no radiopaque foreign body identified with attention to the plantar aspect of the foot. Joint spaces are maintained. No acute fracture.            At the end of the encounter, I discussed results, diagnosis,  medications. Discussed indications for follow up if no improvement. Patient understood and agreed to plan. Patient was stable for discharge.    Eric Rucker MD, MPH

## 2024-09-02 ENCOUNTER — HOSPITAL ENCOUNTER (EMERGENCY)
Facility: CLINIC | Age: 24
Discharge: HOME | End: 2024-09-02
Attending: PHYSICIAN ASSISTANT | Admitting: PHYSICIAN ASSISTANT
Payer: COMMERCIAL

## 2024-09-02 ENCOUNTER — APPOINTMENT (OUTPATIENT)
Dept: GENERAL RADIOLOGY | Facility: CLINIC | Age: 24
End: 2024-09-02
Attending: PHYSICIAN ASSISTANT
Payer: COMMERCIAL

## 2024-09-02 VITALS
SYSTOLIC BLOOD PRESSURE: 119 MMHG | OXYGEN SATURATION: 100 % | RESPIRATION RATE: 18 BRPM | TEMPERATURE: 97.8 F | DIASTOLIC BLOOD PRESSURE: 70 MMHG | HEART RATE: 83 BPM

## 2024-09-02 DIAGNOSIS — S80.11XA CONTUSION OF RIGHT KNEE AND LOWER LEG, INITIAL ENCOUNTER: ICD-10-CM

## 2024-09-02 DIAGNOSIS — S80.12XA CONTUSION OF LEFT LOWER LEG, INITIAL ENCOUNTER: ICD-10-CM

## 2024-09-02 DIAGNOSIS — S80.01XA CONTUSION OF RIGHT KNEE AND LOWER LEG, INITIAL ENCOUNTER: ICD-10-CM

## 2024-09-02 PROCEDURE — 73590 X-RAY EXAM OF LOWER LEG: CPT | Mod: 50

## 2024-09-02 PROCEDURE — 99213 OFFICE O/P EST LOW 20 MIN: CPT | Performed by: PHYSICIAN ASSISTANT

## 2024-09-02 PROCEDURE — G0463 HOSPITAL OUTPT CLINIC VISIT: HCPCS | Performed by: PHYSICIAN ASSISTANT

## 2024-09-02 PROCEDURE — 73562 X-RAY EXAM OF KNEE 3: CPT | Mod: RT

## 2024-09-02 ASSESSMENT — COLUMBIA-SUICIDE SEVERITY RATING SCALE - C-SSRS
1. IN THE PAST MONTH, HAVE YOU WISHED YOU WERE DEAD OR WISHED YOU COULD GO TO SLEEP AND NOT WAKE UP?: NO
6. HAVE YOU EVER DONE ANYTHING, STARTED TO DO ANYTHING, OR PREPARED TO DO ANYTHING TO END YOUR LIFE?: NO
2. HAVE YOU ACTUALLY HAD ANY THOUGHTS OF KILLING YOURSELF IN THE PAST MONTH?: NO

## 2024-09-02 ASSESSMENT — ACTIVITIES OF DAILY LIVING (ADL)
ADLS_ACUITY_SCORE: 36
ADLS_ACUITY_SCORE: 34
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
## 2024-09-03 ASSESSMENT — ENCOUNTER SYMPTOMS
FEVER: 0
COLOR CHANGE: 1
WHEEZING: 0
COUGH: 0
WEAKNESS: 0
NUMBNESS: 0
CHILLS: 0

## 2024-09-05 ENCOUNTER — THERAPY VISIT (OUTPATIENT)
Dept: SPEECH THERAPY | Facility: REHABILITATION | Age: 24
End: 2024-09-05
Payer: COMMERCIAL

## 2024-09-05 DIAGNOSIS — F80.0 ARTICULATION DISORDER: Primary | ICD-10-CM

## 2024-09-05 PROCEDURE — 92507 TX SP LANG VOICE COMM INDIV: CPT | Mod: GN | Performed by: SPEECH-LANGUAGE PATHOLOGIST

## 2024-09-12 ENCOUNTER — THERAPY VISIT (OUTPATIENT)
Dept: SPEECH THERAPY | Facility: REHABILITATION | Age: 24
End: 2024-09-12
Payer: COMMERCIAL

## 2024-09-12 DIAGNOSIS — F80.0 ARTICULATION DISORDER: Primary | ICD-10-CM

## 2024-09-12 PROCEDURE — 92507 TX SP LANG VOICE COMM INDIV: CPT | Mod: GN | Performed by: SPEECH-LANGUAGE PATHOLOGIST

## 2024-09-19 ENCOUNTER — THERAPY VISIT (OUTPATIENT)
Dept: SPEECH THERAPY | Facility: REHABILITATION | Age: 24
End: 2024-09-19
Payer: COMMERCIAL

## 2024-09-19 DIAGNOSIS — F80.0 ARTICULATION DISORDER: Primary | ICD-10-CM

## 2024-09-19 PROCEDURE — 92507 TX SP LANG VOICE COMM INDIV: CPT | Mod: GN | Performed by: SPEECH-LANGUAGE PATHOLOGIST

## 2024-10-22 ENCOUNTER — THERAPY VISIT (OUTPATIENT)
Dept: SPEECH THERAPY | Facility: REHABILITATION | Age: 24
End: 2024-10-22
Payer: COMMERCIAL

## 2024-10-22 DIAGNOSIS — F80.0 ARTICULATION DISORDER: Primary | ICD-10-CM

## 2024-10-22 PROCEDURE — 92507 TX SP LANG VOICE COMM INDIV: CPT | Mod: GN | Performed by: SPEECH-LANGUAGE PATHOLOGIST

## 2024-11-15 ENCOUNTER — OFFICE VISIT (OUTPATIENT)
Dept: URGENT CARE | Facility: URGENT CARE | Age: 24
End: 2024-11-15
Payer: COMMERCIAL

## 2024-11-15 VITALS
HEART RATE: 94 BPM | RESPIRATION RATE: 16 BRPM | TEMPERATURE: 99.4 F | OXYGEN SATURATION: 97 % | SYSTOLIC BLOOD PRESSURE: 117 MMHG | DIASTOLIC BLOOD PRESSURE: 82 MMHG

## 2024-11-15 DIAGNOSIS — L03.211 CELLULITIS OF FACE: Primary | ICD-10-CM

## 2024-11-15 PROCEDURE — 99213 OFFICE O/P EST LOW 20 MIN: CPT | Performed by: FAMILY MEDICINE

## 2024-11-15 NOTE — PROGRESS NOTES
Assessment & Plan     (L03.211) Cellulitis of face  (primary encounter diagnosis)  Comment:   Plan: amoxicillin-clavulanate (AUGMENTIN) 875-125 MG         tablet          Patient has complained of left upper tooth pain that could be the source of the infection and swelling in his face.  He also had a large acne pustule yesterday that was drained that mom describes as quite significant.  But he definitely has some swelling on the left side of his face consistent with infection and we will treat him with antibiotics and hope it resolves the issue if he continues to have tooth pain or continued swelling he should see a dentist also                No follow-ups on file.    Ghulam Sheets is a 24 year old, presenting for the following health issues:  Mouth/Lip Problem (Starting with swelling on lips today now also has swelling on face also)    Patient here with swelling on the left cheek jaw and left side of his lips.  Yesterday he had an inflammatory pustule on the cheek that mom says she was able to drain and there is a large amount of purulent material she treated with some hydroperoxide and today it is just scabbed.  However he developed a swelling.  He is eating okay has been drinking has not had any shortness of breath no trouble with his vision                       Objective    /82   Pulse 94   Temp 99.4  F (37.4  C) (Oral)   Resp 16   SpO2 97%   There is no height or weight on file to calculate BMI.  Physical Exam  Vitals and nursing note reviewed.   HENT:      Head:      Comments: There is swelling that is involving the left cheek down to the left jawline and over to the lateral third of the upper lip    Oropharynx is unremarkable without any gum swelling  Cardiovascular:      Pulses: Normal pulses.   Abdominal:      General: Abdomen is flat.   Skin:     General: Skin is warm.   Neurological:      Mental Status: He is alert.   Psychiatric:         Mood and Affect: Mood normal.                     Signed Electronically by: Bird Arauz MD

## (undated) DEVICE — SOL WATER IRRIG 1000ML BOTTLE 2F7114

## (undated) DEVICE — PACK NEURO MINOR UMMC SNE32MNMU4

## (undated) DEVICE — ESU NDL COLORADO MICRO E1651

## (undated) DEVICE — LINEN TOWEL PACK X30 5481

## (undated) DEVICE — EYE PREP BETADINE 5% SOLUTION 30ML 0065-0411-30

## (undated) DEVICE — BLADE SAW RECIP STRK EXCURSION 14.5X.635MM 5100-037-011

## (undated) DEVICE — ESU GROUND PAD ADULT W/CORD E7507

## (undated) DEVICE — DRSG JAWBRA  95

## (undated) DEVICE — STRAP UNIVERSAL POSITIONING 2-PIECE 4X47X76" 91-287

## (undated) DEVICE — ADH LIQUID MASTISOL TOPICAL VIAL 2-3ML 0523-48

## (undated) DEVICE — PREP POVIDONE IODINE SOLUTION 10% 4OZ BOTTLE 29906-004

## (undated) DEVICE — Device

## (undated) DEVICE — SUCTION MANIFOLD NEPTUNE 2 SYS 4 PORT 0702-020-000

## (undated) DEVICE — GLOVE BIOGEL PI SZ 8.5 40885

## (undated) DEVICE — SUCTION TIP YANKAUER W/O VENT K86

## (undated) DEVICE — GLOVE PROTEXIS POWDER FREE 8.5 ORTHOPEDIC 2D73ET85

## (undated) DEVICE — SOL NACL 0.9% IRRIG 1000ML BOTTLE 2F7124

## (undated) DEVICE — PREP POVIDONE-IODINE 7.5% SCRUB 4OZ BOTTLE MDS093945

## (undated) DEVICE — SU CHROMIC 3-0 SH 27" G122H

## (undated) DEVICE — SU PDS II 2-0 SH 27" Z317H

## (undated) DEVICE — PAD CHUX UNDERPAD 23X24" 7136

## (undated) DEVICE — LINEN TOWEL PACK X6 WHITE 5487

## (undated) DEVICE — BRUSH SURGICAL EZ SCRUB PLAIN 371603

## (undated) DEVICE — KIT PATIENT SPECIFIC INSTR/PLAN ORTHO 1 SPLINT SD900.009

## (undated) DEVICE — DRILL BIT SYN 1.5X44.5 W/6MM STOP J LATCH 317.66

## (undated) DEVICE — PREP SKIN SCRUB TRAY 4461A

## (undated) DEVICE — TOOTHBRUSH ADULT NON STERILE MDS136850

## (undated) DEVICE — LINEN TOWEL PACK X5 5464

## (undated) DEVICE — SYR EAR BULB 3OZ 0035830

## (undated) DEVICE — TAPE CLOTH ADHESIVE 3" LATEX 3554C

## (undated) DEVICE — PREP POVIDONE-IODINE 10% SOLUTION 4OZ BOTTLE MDS093944

## (undated) DEVICE — SPLINT PATIENT SPECIFIC ORTHO FINAL SD900.106

## (undated) DEVICE — NDL 25GA 2"  8881200441

## (undated) RX ORDER — OXYMETAZOLINE HYDROCHLORIDE 0.05 G/100ML
SPRAY NASAL
Status: DISPENSED
Start: 2022-10-26

## (undated) RX ORDER — HYDROMORPHONE HYDROCHLORIDE 1 MG/ML
INJECTION, SOLUTION INTRAMUSCULAR; INTRAVENOUS; SUBCUTANEOUS
Status: DISPENSED
Start: 2022-10-26

## (undated) RX ORDER — CEFAZOLIN SODIUM/WATER 2 G/20 ML
SYRINGE (ML) INTRAVENOUS
Status: DISPENSED
Start: 2023-04-04

## (undated) RX ORDER — CHLORHEXIDINE GLUCONATE ORAL RINSE 1.2 MG/ML
SOLUTION DENTAL
Status: DISPENSED
Start: 2022-10-26

## (undated) RX ORDER — CEFAZOLIN SODIUM/WATER 2 G/20 ML
SYRINGE (ML) INTRAVENOUS
Status: DISPENSED
Start: 2022-10-26

## (undated) RX ORDER — FENTANYL CITRATE 50 UG/ML
INJECTION, SOLUTION INTRAMUSCULAR; INTRAVENOUS
Status: DISPENSED
Start: 2022-10-26

## (undated) RX ORDER — KETOROLAC TROMETHAMINE 30 MG/ML
INJECTION, SOLUTION INTRAMUSCULAR; INTRAVENOUS
Status: DISPENSED
Start: 2023-04-04

## (undated) RX ORDER — PROPOFOL 10 MG/ML
INJECTION, EMULSION INTRAVENOUS
Status: DISPENSED
Start: 2023-04-04

## (undated) RX ORDER — FENTANYL CITRATE 50 UG/ML
INJECTION, SOLUTION INTRAMUSCULAR; INTRAVENOUS
Status: DISPENSED
Start: 2023-04-04

## (undated) RX ORDER — DEXAMETHASONE SODIUM PHOSPHATE 4 MG/ML
INJECTION, SOLUTION INTRA-ARTICULAR; INTRALESIONAL; INTRAMUSCULAR; INTRAVENOUS; SOFT TISSUE
Status: DISPENSED
Start: 2023-04-04

## (undated) RX ORDER — ONDANSETRON 2 MG/ML
INJECTION INTRAMUSCULAR; INTRAVENOUS
Status: DISPENSED
Start: 2023-04-04

## (undated) RX ORDER — ACETAMINOPHEN 325 MG/1
TABLET ORAL
Status: DISPENSED
Start: 2022-10-26

## (undated) RX ORDER — CHLORHEXIDINE GLUCONATE ORAL RINSE 1.2 MG/ML
SOLUTION DENTAL
Status: DISPENSED
Start: 2023-04-04